# Patient Record
Sex: MALE | Race: WHITE | Employment: OTHER | URBAN - METROPOLITAN AREA
[De-identification: names, ages, dates, MRNs, and addresses within clinical notes are randomized per-mention and may not be internally consistent; named-entity substitution may affect disease eponyms.]

---

## 2019-03-20 ENCOUNTER — APPOINTMENT (OUTPATIENT)
Dept: GENERAL RADIOLOGY | Age: 84
DRG: 291 | End: 2019-03-20
Attending: PHYSICIAN ASSISTANT
Payer: MEDICARE

## 2019-03-20 ENCOUNTER — APPOINTMENT (OUTPATIENT)
Dept: VASCULAR SURGERY | Age: 84
DRG: 291 | End: 2019-03-20
Attending: PHYSICIAN ASSISTANT
Payer: MEDICARE

## 2019-03-20 ENCOUNTER — HOSPITAL ENCOUNTER (INPATIENT)
Age: 84
LOS: 2 days | Discharge: HOME HEALTH | DRG: 291 | End: 2019-03-22
Attending: EMERGENCY MEDICINE | Admitting: INTERNAL MEDICINE
Payer: MEDICARE

## 2019-03-20 DIAGNOSIS — I50.9 CONGESTIVE HEART FAILURE, UNSPECIFIED HF CHRONICITY, UNSPECIFIED HEART FAILURE TYPE (HCC): Primary | ICD-10-CM

## 2019-03-20 PROBLEM — I50.41 ACUTE COMBINED SYSTOLIC AND DIASTOLIC HEART FAILURE (HCC): Status: ACTIVE | Noted: 2017-12-04

## 2019-03-20 PROBLEM — I50.22 CHRONIC SYSTOLIC CONGESTIVE HEART FAILURE (HCC): Status: ACTIVE | Noted: 2018-03-14

## 2019-03-20 PROBLEM — E16.2 HYPOGLYCEMIA: Status: ACTIVE | Noted: 2019-03-20

## 2019-03-20 PROBLEM — D64.9 CHRONIC ANEMIA: Status: ACTIVE | Noted: 2018-05-23

## 2019-03-20 PROBLEM — I48.91 ATRIAL FIBRILLATION (HCC): Status: ACTIVE | Noted: 2017-08-25

## 2019-03-20 LAB
ALBUMIN SERPL-MCNC: 3 G/DL (ref 3.4–5)
ALBUMIN/GLOB SERPL: 0.7 {RATIO} (ref 0.8–1.7)
ALP SERPL-CCNC: 325 U/L (ref 45–117)
ALT SERPL-CCNC: 34 U/L (ref 16–61)
ANION GAP SERPL CALC-SCNC: 3 MMOL/L (ref 3–18)
AST SERPL-CCNC: 49 U/L (ref 15–37)
BASOPHILS # BLD: 0 K/UL (ref 0–0.1)
BASOPHILS NFR BLD: 1 % (ref 0–2)
BILIRUB SERPL-MCNC: 1 MG/DL (ref 0.2–1)
BNP SERPL-MCNC: ABNORMAL PG/ML (ref 0–1800)
BUN SERPL-MCNC: 25 MG/DL (ref 7–18)
BUN/CREAT SERPL: 22 (ref 12–20)
CALCIUM SERPL-MCNC: 8.5 MG/DL (ref 8.5–10.1)
CHLORIDE SERPL-SCNC: 103 MMOL/L (ref 100–108)
CK MB CFR SERPL CALC: 3.5 % (ref 0–4)
CK MB SERPL-MCNC: 2.8 NG/ML (ref 5–25)
CK SERPL-CCNC: 80 U/L (ref 39–308)
CO2 SERPL-SCNC: 35 MMOL/L (ref 21–32)
CREAT SERPL-MCNC: 1.13 MG/DL (ref 0.6–1.3)
DIFFERENTIAL METHOD BLD: ABNORMAL
EOSINOPHIL # BLD: 0 K/UL (ref 0–0.4)
EOSINOPHIL NFR BLD: 0 % (ref 0–5)
ERYTHROCYTE [DISTWIDTH] IN BLOOD BY AUTOMATED COUNT: 16.4 % (ref 11.6–14.5)
GLOBULIN SER CALC-MCNC: 4.2 G/DL (ref 2–4)
GLUCOSE SERPL-MCNC: 73 MG/DL (ref 74–99)
HCT VFR BLD AUTO: 42 % (ref 36–48)
HGB BLD-MCNC: 14.4 G/DL (ref 13–16)
LYMPHOCYTES # BLD: 0.8 K/UL (ref 0.9–3.6)
LYMPHOCYTES NFR BLD: 14 % (ref 21–52)
MCH RBC QN AUTO: 34.2 PG (ref 24–34)
MCHC RBC AUTO-ENTMCNC: 34.3 G/DL (ref 31–37)
MCV RBC AUTO: 99.8 FL (ref 74–97)
MONOCYTES # BLD: 0.5 K/UL (ref 0.05–1.2)
MONOCYTES NFR BLD: 10 % (ref 3–10)
NEUTS SEG # BLD: 4 K/UL (ref 1.8–8)
NEUTS SEG NFR BLD: 75 % (ref 40–73)
PLATELET # BLD AUTO: 132 K/UL (ref 135–420)
PMV BLD AUTO: 12.8 FL (ref 9.2–11.8)
POTASSIUM SERPL-SCNC: 4 MMOL/L (ref 3.5–5.5)
PROT SERPL-MCNC: 7.2 G/DL (ref 6.4–8.2)
RBC # BLD AUTO: 4.21 M/UL (ref 4.7–5.5)
SODIUM SERPL-SCNC: 141 MMOL/L (ref 136–145)
TROPONIN I SERPL-MCNC: 0.09 NG/ML (ref 0–0.04)
WBC # BLD AUTO: 5.3 K/UL (ref 4.6–13.2)

## 2019-03-20 PROCEDURE — 85025 COMPLETE CBC W/AUTO DIFF WBC: CPT

## 2019-03-20 PROCEDURE — 82550 ASSAY OF CK (CPK): CPT

## 2019-03-20 PROCEDURE — 83036 HEMOGLOBIN GLYCOSYLATED A1C: CPT

## 2019-03-20 PROCEDURE — 65270000029 HC RM PRIVATE

## 2019-03-20 PROCEDURE — 80162 ASSAY OF DIGOXIN TOTAL: CPT

## 2019-03-20 PROCEDURE — 80053 COMPREHEN METABOLIC PANEL: CPT

## 2019-03-20 PROCEDURE — 99285 EMERGENCY DEPT VISIT HI MDM: CPT

## 2019-03-20 PROCEDURE — 94761 N-INVAS EAR/PLS OXIMETRY MLT: CPT

## 2019-03-20 PROCEDURE — 71045 X-RAY EXAM CHEST 1 VIEW: CPT

## 2019-03-20 PROCEDURE — 74011250636 HC RX REV CODE- 250/636: Performed by: PHYSICIAN ASSISTANT

## 2019-03-20 PROCEDURE — 93005 ELECTROCARDIOGRAM TRACING: CPT

## 2019-03-20 PROCEDURE — 96374 THER/PROPH/DIAG INJ IV PUSH: CPT

## 2019-03-20 PROCEDURE — 93971 EXTREMITY STUDY: CPT

## 2019-03-20 PROCEDURE — 83880 ASSAY OF NATRIURETIC PEPTIDE: CPT

## 2019-03-20 RX ORDER — CHOLECALCIFEROL (VITAMIN D3) 125 MCG
2000 CAPSULE ORAL DAILY
COMMUNITY

## 2019-03-20 RX ORDER — MAGNESIUM SULFATE 100 %
4 CRYSTALS MISCELLANEOUS AS NEEDED
Status: DISCONTINUED | OUTPATIENT
Start: 2019-03-20 | End: 2019-03-22 | Stop reason: HOSPADM

## 2019-03-20 RX ORDER — TORSEMIDE 10 MG/1
10 TABLET ORAL DAILY
COMMUNITY

## 2019-03-20 RX ORDER — DEXTROSE 50 % IN WATER (D50W) INTRAVENOUS SYRINGE
25-50 AS NEEDED
Status: DISCONTINUED | OUTPATIENT
Start: 2019-03-20 | End: 2019-03-22 | Stop reason: HOSPADM

## 2019-03-20 RX ORDER — FUROSEMIDE 10 MG/ML
40 INJECTION INTRAMUSCULAR; INTRAVENOUS EVERY 12 HOURS
Status: DISCONTINUED | OUTPATIENT
Start: 2019-03-20 | End: 2019-03-22 | Stop reason: HOSPADM

## 2019-03-20 RX ORDER — METOPROLOL SUCCINATE 100 MG/1
100 TABLET, EXTENDED RELEASE ORAL DAILY
COMMUNITY

## 2019-03-20 RX ORDER — SIMVASTATIN 20 MG/1
20 TABLET, FILM COATED ORAL
COMMUNITY

## 2019-03-20 RX ORDER — GUAIFENESIN 100 MG/5ML
81 LIQUID (ML) ORAL DAILY
COMMUNITY

## 2019-03-20 RX ORDER — INSULIN LISPRO 100 [IU]/ML
INJECTION, SOLUTION INTRAVENOUS; SUBCUTANEOUS
Status: DISCONTINUED | OUTPATIENT
Start: 2019-03-21 | End: 2019-03-22 | Stop reason: HOSPADM

## 2019-03-20 RX ORDER — SODIUM FLUORIDE 5 MG/G
PASTE, DENTIFRICE DENTAL
COMMUNITY
End: 2019-03-26 | Stop reason: CLARIF

## 2019-03-20 RX ORDER — ACETAMINOPHEN 325 MG/1
650 TABLET ORAL
Status: DISCONTINUED | OUTPATIENT
Start: 2019-03-20 | End: 2019-03-22 | Stop reason: HOSPADM

## 2019-03-20 RX ORDER — FUROSEMIDE 10 MG/ML
40 INJECTION INTRAMUSCULAR; INTRAVENOUS
Status: COMPLETED | OUTPATIENT
Start: 2019-03-20 | End: 2019-03-20

## 2019-03-20 RX ORDER — DIGOXIN 125 MCG
0.12 TABLET ORAL DAILY
COMMUNITY

## 2019-03-20 RX ADMIN — FUROSEMIDE 40 MG: 10 INJECTION, SOLUTION INTRAMUSCULAR; INTRAVENOUS at 22:48

## 2019-03-20 NOTE — ED PROVIDER NOTES
EMERGENCY DEPARTMENT HISTORY AND PHYSICAL EXAM 
 
Date: 3/20/2019 Patient Name: Alejandra Manzo History of Presenting Illness Chief Complaint Patient presents with  Leg Injury History Provided By: Patient Alejandra Manzo is a 80 y.o. male with PMHX of congestive heart failure and hypertension who presents to the emergency department C/O left lower extremity swelling associated sxs include left lower extremity wounds with clear oozing. Since and his wife report a history of chronic leg edema on daily fluid pill reports 1 month ago started with increased swelling to left lower extremity developing 2 small ulcerated type wounds to the lateral aspect the left lower leg with some clear oozing drainage. Patient was seen at a local urgent care prescribed topical ointment with minimal relief. His wife said they were seen in PCPs office next couple of days following that was prescribed oral antibiotic Bactrim patient took 3 doses of developed a rash of the antibiotic was discontinued. Patient and his wife reports attempts at elevating the leg (however on further evaluation patient not elevating the leg to high enough level to help with edema) with no relief of the swelling. Pt denies chest pain shortness of breath leg pain purulent drainage injury to lower extremity, and any other sxs or complaints. PCP: Trevor Lemon MD 
 
 
 
Past History Past Medical History: 
Past Medical History:  
Diagnosis Date  CHF (congestive heart failure) ([facility] [address]  Hypertension Past Surgical History: 
History reviewed. No pertinent surgical history. Family History: 
History reviewed. No pertinent family history. Social History: 
Social History Tobacco Use  Smoking status: Never Smoker  Smokeless tobacco: Never Used Substance Use Topics  Alcohol use: Yes Frequency: Never Comment: socially  Drug use: Never Allergies: 
No Known Allergies Review of Systems Review of Systems Constitutional: Negative for fever. Respiratory: Negative for shortness of breath. Cardiovascular: Positive for leg swelling. Negative for chest pain. Skin: Positive for wound. All other systems reviewed and are negative. Physical Exam  
 
Vitals:  
 03/20/19 1832 03/20/19 2210 03/20/19 2213 BP: 107/80 117/83 117/83 Pulse: 89 94 94 Resp: 16 14 14 Temp: 98.1 °F (36.7 °C)  97.6 °F (36.4 °C) SpO2: 100% 97% 98% Weight: 60.8 kg (134 lb) Height: 5' 9\" (1.753 m) Physical Exam  
Constitutional: He is oriented to person, place, and time. He appears well-developed and well-nourished. No distress. HENT:  
Head: Normocephalic and atraumatic. Eyes: Pupils are equal, round, and reactive to light. Conjunctivae and EOM are normal.  
Neck: Normal range of motion. Neck supple. Cardiovascular: Normal rate and regular rhythm. Pulmonary/Chest: Effort normal and breath sounds normal.  
Musculoskeletal: Normal range of motion. He exhibits edema. He exhibits no tenderness. Legs: LLE: nonpitting edema with 2 small open blister-like lesions to lateral aspect each with small halo of erythema, +thin clear/serous drainage from wound noted; NO streaking or ttp; FROM, NVI Neurological: He is alert and oriented to person, place, and time. Skin: Skin is warm and dry. Psychiatric: He has a normal mood and affect. His behavior is normal.  
Nursing note and vitals reviewed. Diagnostic Study Results Labs - Recent Results (from the past 12 hour(s)) CBC WITH AUTOMATED DIFF Collection Time: 03/20/19  7:55 PM  
Result Value Ref Range WBC 5.3 4.6 - 13.2 K/uL  
 RBC 4.21 (L) 4.70 - 5.50 M/uL  
 HGB 14.4 13.0 - 16.0 g/dL HCT 42.0 36.0 - 48.0 % MCV 99.8 (H) 74.0 - 97.0 FL  
 MCH 34.2 (H) 24.0 - 34.0 PG  
 MCHC 34.3 31.0 - 37.0 g/dL  
 RDW 16.4 (H) 11.6 - 14.5 % PLATELET 746 (L) 656 - 420 K/uL  MPV 12.8 (H) 9.2 - 11.8 FL  
 NEUTROPHILS 75 (H) 40 - 73 % LYMPHOCYTES 14 (L) 21 - 52 % MONOCYTES 10 3 - 10 % EOSINOPHILS 0 0 - 5 % BASOPHILS 1 0 - 2 %  
 ABS. NEUTROPHILS 4.0 1.8 - 8.0 K/UL  
 ABS. LYMPHOCYTES 0.8 (L) 0.9 - 3.6 K/UL  
 ABS. MONOCYTES 0.5 0.05 - 1.2 K/UL  
 ABS. EOSINOPHILS 0.0 0.0 - 0.4 K/UL  
 ABS. BASOPHILS 0.0 0.0 - 0.1 K/UL  
 DF AUTOMATED METABOLIC PANEL, COMPREHENSIVE Collection Time: 03/20/19  7:55 PM  
Result Value Ref Range Sodium 141 136 - 145 mmol/L Potassium 4.0 3.5 - 5.5 mmol/L Chloride 103 100 - 108 mmol/L  
 CO2 35 (H) 21 - 32 mmol/L Anion gap 3 3.0 - 18 mmol/L Glucose 73 (L) 74 - 99 mg/dL BUN 25 (H) 7.0 - 18 MG/DL Creatinine 1.13 0.6 - 1.3 MG/DL  
 BUN/Creatinine ratio 22 (H) 12 - 20 GFR est AA >60 >60 ml/min/1.73m2 GFR est non-AA >60 >60 ml/min/1.73m2 Calcium 8.5 8.5 - 10.1 MG/DL Bilirubin, total 1.0 0.2 - 1.0 MG/DL  
 ALT (SGPT) 34 16 - 61 U/L  
 AST (SGOT) 49 (H) 15 - 37 U/L Alk. phosphatase 325 (H) 45 - 117 U/L Protein, total 7.2 6.4 - 8.2 g/dL Albumin 3.0 (L) 3.4 - 5.0 g/dL Globulin 4.2 (H) 2.0 - 4.0 g/dL A-G Ratio 0.7 (L) 0.8 - 1.7 CARDIAC PANEL,(CK, CKMB & TROPONIN) Collection Time: 03/20/19  7:55 PM  
Result Value Ref Range CK 80 39 - 308 U/L  
 CK - MB 2.8 <3.6 ng/ml CK-MB Index 3.5 0.0 - 4.0 % Troponin-I, QT 0.09 (H) 0.0 - 0.045 NG/ML  
NT-PRO BNP Collection Time: 03/20/19  8:30 PM  
Result Value Ref Range NT pro-BNP 17,709 (H) 0 - 1,800 PG/ML  
EKG, 12 LEAD, INITIAL Collection Time: 03/20/19  9:50 PM  
Result Value Ref Range Ventricular Rate 102 BPM  
 Atrial Rate 122 BPM  
 P-R Interval 200 ms QRS Duration 182 ms Q-T Interval 420 ms QTC Calculation (Bezet) 547 ms Calculated R Axis -25 degrees Calculated T Axis 160 degrees Diagnosis Sinus tachycardia with occasional premature ventricular complexes Left bundle branch block Abnormal ECG No previous ECGs available Radiologic Studies -  
XR CHEST PORT Final Result IMPRESSION: Small effusions bilaterally with right lung base atelectasis and  
mild pulmonary edema CT Results  (Last 48 hours) None CXR Results  (Last 48 hours) 03/20/19 2005  XR CHEST PORT Final result Impression:  IMPRESSION: Small effusions bilaterally with right lung base atelectasis and  
mild pulmonary edema Narrative:  EXAM:  AP Portable Chest X-ray 1 view INDICATION: History of CHF, evaluate for CHF  
   
COMPARISON: None  
   
_______________ FINDINGS: There are sternotomy wires from prior thoracotomy. Heart size is  
enlarged and mediastinal contours are within normal limits for portable  
radiograph. There are increased interstitial markings suggesting mild pulmonary  
edema. There is an elevated left hemidiaphragm. There is mild blunting of both  
costophrenic angle suggesting small effusions. There is right lung base  
atelectasis. No acute osseous findings. ________________ VASCULAR STUDY: 
Right Lower Venous No evidence of deep vein thrombosis in the right lower extremity. The common femoral vein(s) were imaged in the transverse and longitudinal planes. The vessels showed normal color filling and compressibility. Doppler interrogation of the veins showed phasic and spontaneous flow. Pulsatile flow pattern noted in the right common femoral vein. Left Lower Venous No evidence of deep vein thrombosis in the left lower extremity. The common femoral, saphenous femoral junction, profunda femoral, proximal femoral, mid femoral, distal femoral, popliteal, posterior tibial and peroneal vein(s) were imaged in the transverse and longitudinal planes. The vessels showed normal color filling and compressibility. Doppler interrogation of the veins showed phasic and spontaneous flow. Pulsatile flow pattern noted in the left lower extremity throughout. Medications given in the ED- Medications  
furosemide (LASIX) injection 40 mg (has no administration in time range) Medical Decision Making I am the first provider for this patient. I reviewed the vital signs, available nursing notes, past medical history, past surgical history, family history and social history. Vital Signs-Reviewed the patient's vital signs. Pulse Oximetry Analysis - 100% on RA Records Reviewed: Nursing Notes Procedures: 
Procedures ED Course:  
7:16 PM  
Initial assessment performed. The patients presenting problems have been discussed, and they are in agreement with the care plan formulated and outlined with them. I have encouraged them to ask questions as they arise throughout their visit. FACE-TO-FACE PROGRESS NOTE: 
10:22 PM  
She was interviewed bedside with the PA. Patient has bilateral lower extremity edema with weeping from his left lower extremity. There is some erythema extending proximally from his left lateral wound going medially into his thigh which is warm. Has no chest pain or shortness of breath. His O2 saturation on room air was 97 nonlabored. It is likely that his lower extremity edema secondary to heart failure. Lab analysis noted to BNP elevation of 17,000. Chest x-ray shows bilateral effusions with pulmonary vascular congestion. Last echocardiogram was 2017 with an EF of 30%. We will seek admission speak with the hospitalist for diuresis and further evaluation. I have personally seen and examined the patient, reviewed the ZOILA's note and agree with findings and plan. Dictated by Dr Jasbir Johnson, ED Attending Diagnosis and Disposition Critical Care Time: 0 Core Measures: 
For Hospitalized Patients: 
 
1. Hospitalization Decision Time: The decision to hospitalize the patient was made by SPENCER Viera  at 10:34 PM  on 3/20/2019 2.  Aspirin: Aspirin was not given because the patient did not present with a stroke at the time of their Emergency Department evaluation 11:04 PM  Patient is being admitted to the hospital by Dr Maria Guadalupe Funk. The results of their tests and reasons for their admission have been discussed with them and/or available family. They convey agreement and understanding for the need to be admitted and for their admission diagnosis. CONDITIONS ON ADMISSION: 
Sepsis is not present at the time of admission. Deep Vein Thrombosis is not present at the time of admission. Thrombosis is not present at the time of admission. Pneumonia is not present at the time of admission. MRSA is not present at the time of admission. Wound infection is not present at the time of admission. Pressure Ulcer is present at the time of admission. CLINICAL IMPRESSION: 
 
1. Congestive heart failure, unspecified HF chronicity, unspecified heart failure type ([address] Please note that this dictation was completed with [employer], the computer voice recognition software. Quite often unanticipated grammatical, syntax, homophones, and other interpretive errors are inadvertently transcribed by the computer software. Please disregard these errors. Please excuse any errors that have escaped final proofreading.

## 2019-03-20 NOTE — ED TRIAGE NOTES
Patient ambulate to ED with left lower leg drainage since this afternoon, patient denies injury, reports increase in swelling over last month, a/ox3, wound dressing, clean/dry/intact in triage

## 2019-03-21 ENCOUNTER — APPOINTMENT (OUTPATIENT)
Dept: NON INVASIVE DIAGNOSTICS | Age: 84
DRG: 291 | End: 2019-03-21
Attending: INTERNAL MEDICINE
Payer: MEDICARE

## 2019-03-21 PROBLEM — I87.2 VENOUS INSUFFICIENCY OF BOTH LOWER EXTREMITIES: Status: ACTIVE | Noted: 2019-03-21

## 2019-03-21 LAB
ALBUMIN SERPL-MCNC: 2.7 G/DL (ref 3.4–5)
ALBUMIN/GLOB SERPL: 0.7 {RATIO} (ref 0.8–1.7)
ALP SERPL-CCNC: 303 U/L (ref 45–117)
ALT SERPL-CCNC: 30 U/L (ref 16–61)
ANION GAP SERPL CALC-SCNC: 5 MMOL/L (ref 3–18)
AST SERPL-CCNC: 39 U/L (ref 15–37)
BASOPHILS # BLD: 0 K/UL (ref 0–0.1)
BASOPHILS NFR BLD: 1 % (ref 0–2)
BILIRUB SERPL-MCNC: 1.1 MG/DL (ref 0.2–1)
BUN SERPL-MCNC: 22 MG/DL (ref 7–18)
BUN/CREAT SERPL: 21 (ref 12–20)
CALCIUM SERPL-MCNC: 8.4 MG/DL (ref 8.5–10.1)
CHLORIDE SERPL-SCNC: 102 MMOL/L (ref 100–108)
CK MB CFR SERPL CALC: 3.7 % (ref 0–4)
CK MB CFR SERPL CALC: 4 % (ref 0–4)
CK MB SERPL-MCNC: 2.4 NG/ML (ref 5–25)
CK MB SERPL-MCNC: 3.2 NG/ML (ref 5–25)
CK SERPL-CCNC: 65 U/L (ref 39–308)
CK SERPL-CCNC: 80 U/L (ref 39–308)
CO2 SERPL-SCNC: 34 MMOL/L (ref 21–32)
CREAT SERPL-MCNC: 1.04 MG/DL (ref 0.6–1.3)
DIFFERENTIAL METHOD BLD: ABNORMAL
DIGOXIN SERPL-MCNC: 1.2 NG/ML (ref 0.9–2)
ECHO AO ROOT DIAM: 3.24 CM
ECHO AV AREA PEAK VELOCITY: 0.9 CM2
ECHO AV AREA VTI: 0.8 CM2
ECHO AV AREA/BSA PEAK VELOCITY: 0.5 CM2/M2
ECHO AV AREA/BSA VTI: 0.5 CM2/M2
ECHO AV MEAN GRADIENT: 9.2 MMHG
ECHO AV PEAK GRADIENT: 15.9 MMHG
ECHO AV PEAK VELOCITY: 199.29 CM/S
ECHO AV VTI: 36.48 CM
ECHO LA MAJOR AXIS: 5.6 CM
ECHO LA VOL 2C: 123.12 ML (ref 18–58)
ECHO LA VOL 4C: 111.91 ML (ref 18–58)
ECHO LA VOL BP: 132 ML (ref 18–58)
ECHO LA VOL/BSA BIPLANE: 75.76 ML/M2 (ref 16–28)
ECHO LA VOLUME INDEX A2C: 70.66 ML/M2 (ref 16–28)
ECHO LA VOLUME INDEX A4C: 64.23 ML/M2 (ref 16–28)
ECHO LV E' LATERAL VELOCITY: 8 CM/S
ECHO LV E' SEPTAL VELOCITY: 2 CM/S
ECHO LV EDV A2C: 124.7 ML
ECHO LV EDV A4C: 71.7 ML
ECHO LV EDV BP: 96.1 ML (ref 67–155)
ECHO LV EDV INDEX A4C: 41.2 ML/M2
ECHO LV EDV INDEX BP: 55.2 ML/M2
ECHO LV EDV NDEX A2C: 71.6 ML/M2
ECHO LV EJECTION FRACTION A2C: 0 %
ECHO LV EJECTION FRACTION A4C: 18 %
ECHO LV EJECTION FRACTION BIPLANE: 2.5 % (ref 55–100)
ECHO LV ESV A2C: 124.7 ML
ECHO LV ESV A4C: 58.7 ML
ECHO LV ESV BP: 93.7 ML (ref 22–58)
ECHO LV ESV INDEX A2C: 71.6 ML/M2
ECHO LV ESV INDEX A4C: 33.7 ML/M2
ECHO LV ESV INDEX BP: 53.8 ML/M2
ECHO LV INTERNAL DIMENSION DIASTOLIC: 5.18 CM (ref 4.2–5.9)
ECHO LV INTERNAL DIMENSION SYSTOLIC: 4.45 CM
ECHO LV IVSD: 0.97 CM (ref 0.6–1)
ECHO LV MASS 2D: 226.7 G (ref 88–224)
ECHO LV MASS INDEX 2D: 130.1 G/M2 (ref 49–115)
ECHO LV POSTERIOR WALL DIASTOLIC: 1.03 CM (ref 0.6–1)
ECHO LVOT DIAM: 1.94 CM
ECHO LVOT PEAK GRADIENT: 1.4 MMHG
ECHO LVOT PEAK VELOCITY: 59.19 CM/S
ECHO LVOT VTI: 10.15 CM
ECHO MV A VELOCITY: 41.62 CM/S
ECHO MV AREA PHT: 5.3 CM2
ECHO MV AREA PISA: 0.2 CM2
ECHO MV AREA VTI: 1.1 CM2
ECHO MV E DECELERATION TIME (DT): 143.4 MS
ECHO MV E VELOCITY: 110.64 CM/S
ECHO MV E/A RATIO: 2.66
ECHO MV E/E' LATERAL: 13.83
ECHO MV E/E' RATIO (AVERAGED): 34.58
ECHO MV E/E' SEPTAL: 55.32
ECHO MV EROA PISA: 0.2 CM2
ECHO MV MAX VELOCITY: 135.62 CM/S
ECHO MV MEAN GRADIENT: 2.4 MMHG
ECHO MV PEAK GRADIENT: 7.4 MMHG
ECHO MV PRESSURE HALF TIME (PHT): 41.6 MS
ECHO MV REGURGITANT PEAK GRADIENT: 88.9 MMHG
ECHO MV REGURGITANT PEAK VELOCITY: 471.3 CM/S
ECHO MV REGURGITANT RADIUS PISA: 0.6 CM
ECHO MV REGURGITANT VOLUME: 27.94 CC
ECHO MV REGURGITANT VTIA: 183.73 CM
ECHO MV VTI: 27.14 CM
ECHO PULMONARY ARTERY SYSTOLIC PRESSURE (PASP): 35 MMHG
ECHO RA AREA 4C: 31.67 CM2
ECHO TV REGURGITANT MAX VELOCITY: 274.62 CM/S
ECHO TV REGURGITANT PEAK GRADIENT: 30.2 MMHG
EOSINOPHIL # BLD: 0 K/UL (ref 0–0.4)
EOSINOPHIL NFR BLD: 0 % (ref 0–5)
ERYTHROCYTE [DISTWIDTH] IN BLOOD BY AUTOMATED COUNT: 16.4 % (ref 11.6–14.5)
EST. AVERAGE GLUCOSE BLD GHB EST-MCNC: 120 MG/DL
GLOBULIN SER CALC-MCNC: 3.8 G/DL (ref 2–4)
GLUCOSE BLD STRIP.AUTO-MCNC: 123 MG/DL (ref 70–110)
GLUCOSE BLD STRIP.AUTO-MCNC: 140 MG/DL (ref 70–110)
GLUCOSE BLD STRIP.AUTO-MCNC: 85 MG/DL (ref 70–110)
GLUCOSE BLD STRIP.AUTO-MCNC: 86 MG/DL (ref 70–110)
GLUCOSE SERPL-MCNC: 78 MG/DL (ref 74–99)
HBA1C MFR BLD: 5.8 % (ref 4.2–5.6)
HCT VFR BLD AUTO: 41.7 % (ref 36–48)
HGB BLD-MCNC: 14.2 G/DL (ref 13–16)
LYMPHOCYTES # BLD: 0.9 K/UL (ref 0.9–3.6)
LYMPHOCYTES NFR BLD: 20 % (ref 21–52)
MAGNESIUM SERPL-MCNC: 2.5 MG/DL (ref 1.6–2.6)
MCH RBC QN AUTO: 33.7 PG (ref 24–34)
MCHC RBC AUTO-ENTMCNC: 34.1 G/DL (ref 31–37)
MCV RBC AUTO: 99 FL (ref 74–97)
MONOCYTES # BLD: 0.5 K/UL (ref 0.05–1.2)
MONOCYTES NFR BLD: 11 % (ref 3–10)
NEUTS SEG # BLD: 3.1 K/UL (ref 1.8–8)
NEUTS SEG NFR BLD: 68 % (ref 40–73)
PLATELET # BLD AUTO: 134 K/UL (ref 135–420)
PMV BLD AUTO: 12.8 FL (ref 9.2–11.8)
POTASSIUM SERPL-SCNC: 3.3 MMOL/L (ref 3.5–5.5)
PROT SERPL-MCNC: 6.5 G/DL (ref 6.4–8.2)
RBC # BLD AUTO: 4.21 M/UL (ref 4.7–5.5)
SODIUM SERPL-SCNC: 141 MMOL/L (ref 136–145)
TROPONIN I SERPL-MCNC: 0.07 NG/ML (ref 0–0.04)
TROPONIN I SERPL-MCNC: 0.08 NG/ML (ref 0–0.04)
WBC # BLD AUTO: 4.6 K/UL (ref 4.6–13.2)

## 2019-03-21 PROCEDURE — 74011250637 HC RX REV CODE- 250/637: Performed by: INTERNAL MEDICINE

## 2019-03-21 PROCEDURE — 65660000000 HC RM CCU STEPDOWN

## 2019-03-21 PROCEDURE — 74011250636 HC RX REV CODE- 250/636: Performed by: HOSPITALIST

## 2019-03-21 PROCEDURE — 83735 ASSAY OF MAGNESIUM: CPT

## 2019-03-21 PROCEDURE — 97161 PT EVAL LOW COMPLEX 20 MIN: CPT

## 2019-03-21 PROCEDURE — 85025 COMPLETE CBC W/AUTO DIFF WBC: CPT

## 2019-03-21 PROCEDURE — 74011000250 HC RX REV CODE- 250: Performed by: HOSPITALIST

## 2019-03-21 PROCEDURE — 80053 COMPREHEN METABOLIC PANEL: CPT

## 2019-03-21 PROCEDURE — 82962 GLUCOSE BLOOD TEST: CPT

## 2019-03-21 PROCEDURE — 36415 COLL VENOUS BLD VENIPUNCTURE: CPT

## 2019-03-21 PROCEDURE — 74011250636 HC RX REV CODE- 250/636: Performed by: INTERNAL MEDICINE

## 2019-03-21 PROCEDURE — 93306 TTE W/DOPPLER COMPLETE: CPT

## 2019-03-21 PROCEDURE — 74011250637 HC RX REV CODE- 250/637: Performed by: HOSPITALIST

## 2019-03-21 PROCEDURE — 82550 ASSAY OF CK (CPK): CPT

## 2019-03-21 RX ORDER — POTASSIUM CHLORIDE 20 MEQ/1
20 TABLET, EXTENDED RELEASE ORAL 2 TIMES DAILY
Status: DISCONTINUED | OUTPATIENT
Start: 2019-03-21 | End: 2019-03-21

## 2019-03-21 RX ORDER — DIGOXIN 125 MCG
0.12 TABLET ORAL DAILY
Status: DISCONTINUED | OUTPATIENT
Start: 2019-03-21 | End: 2019-03-22 | Stop reason: HOSPADM

## 2019-03-21 RX ORDER — POTASSIUM CHLORIDE 20 MEQ/1
40 TABLET, EXTENDED RELEASE ORAL DAILY
Status: DISCONTINUED | OUTPATIENT
Start: 2019-03-21 | End: 2019-03-22 | Stop reason: HOSPADM

## 2019-03-21 RX ORDER — METOPROLOL SUCCINATE 100 MG/1
100 TABLET, EXTENDED RELEASE ORAL DAILY
Status: DISCONTINUED | OUTPATIENT
Start: 2019-03-21 | End: 2019-03-22 | Stop reason: HOSPADM

## 2019-03-21 RX ORDER — SIMVASTATIN 20 MG/1
20 TABLET, FILM COATED ORAL
Status: DISCONTINUED | OUTPATIENT
Start: 2019-03-21 | End: 2019-03-22 | Stop reason: HOSPADM

## 2019-03-21 RX ORDER — GUAIFENESIN 100 MG/5ML
81 LIQUID (ML) ORAL DAILY
Status: DISCONTINUED | OUTPATIENT
Start: 2019-03-21 | End: 2019-03-22 | Stop reason: HOSPADM

## 2019-03-21 RX ADMIN — ASPIRIN 81 MG 81 MG: 81 TABLET ORAL at 09:13

## 2019-03-21 RX ADMIN — POTASSIUM CHLORIDE 40 MEQ: 20 TABLET, EXTENDED RELEASE ORAL at 15:09

## 2019-03-21 RX ADMIN — METOPROLOL SUCCINATE 100 MG: 100 TABLET, EXTENDED RELEASE ORAL at 09:13

## 2019-03-21 RX ADMIN — CEFTRIAXONE 1 G: 1 INJECTION, POWDER, FOR SOLUTION INTRAMUSCULAR; INTRAVENOUS at 11:56

## 2019-03-21 RX ADMIN — APIXABAN 2.5 MG: 2.5 TABLET, FILM COATED ORAL at 21:14

## 2019-03-21 RX ADMIN — FUROSEMIDE 40 MG: 10 INJECTION, SOLUTION INTRAMUSCULAR; INTRAVENOUS at 09:12

## 2019-03-21 RX ADMIN — MULTIPLE VITAMINS W/ MINERALS TAB 1 TABLET: TAB at 09:13

## 2019-03-21 RX ADMIN — POTASSIUM CHLORIDE 20 MEQ: 20 TABLET, EXTENDED RELEASE ORAL at 11:57

## 2019-03-21 RX ADMIN — FUROSEMIDE 40 MG: 10 INJECTION, SOLUTION INTRAMUSCULAR; INTRAVENOUS at 21:14

## 2019-03-21 RX ADMIN — DIGOXIN 0.12 MG: 125 TABLET ORAL at 09:13

## 2019-03-21 RX ADMIN — SIMVASTATIN 20 MG: 20 TABLET, FILM COATED ORAL at 21:14

## 2019-03-21 RX ADMIN — APIXABAN 2.5 MG: 2.5 TABLET, FILM COATED ORAL at 09:13

## 2019-03-21 NOTE — ROUTINE PROCESS
TRANSFER - OUT REPORT: 
 
Verbal report given to DANNY Cates RN(name) on Kriss August  being transferred to Tele(unit) for routine progression of care Report consisted of patients Situation, Background, Assessment and  
Recommendations(SBAR). Information from the following report(s) SBAR was reviewed with the receiving nurse. Lines:  
Peripheral IV 03/20/19 Left Antecubital (Active) Site Assessment Clean, dry, & intact 3/20/2019  7:55 PM  
Phlebitis Assessment 0 3/20/2019  7:55 PM  
Infiltration Assessment 0 3/20/2019  7:55 PM  
Dressing Status Clean, dry, & intact 3/20/2019  7:55 PM  
Dressing Type Tape;Transparent 3/20/2019  7:55 PM  
Hub Color/Line Status Pink;Capped;Flushed 3/20/2019  7:55 PM  
Action Taken Blood drawn 3/20/2019  7:55 PM  
Alcohol Cap Used Yes 3/20/2019  7:55 PM  
  
 
Opportunity for questions and clarification was provided. Patient transported with: 
 Monitor RRT Tech

## 2019-03-21 NOTE — ROUTINE PROCESS
TRANSFER - IN REPORT: 
 
Verbal report received from Brenna RN(name) on Julito Ralph  being received from ED(unit) for routine progression of care Report consisted of patients Situation, Background, Assessment and  
Recommendations(SBAR). Information from the following report(s) SBAR, Kardex and MAR was reviewed with the receiving nurse. Opportunity for questions and clarification was provided. Assessment completed upon patients arrival to unit and care assumed.

## 2019-03-21 NOTE — CDMP QUERY
After further study, do you concur with the findings of Severe Protein Calorie Malnutrition noted in the Dietitian  Consultation note? ? Severe Protein Calorie Malnutrition ? Moderate/Mild Protein Calorie Malnutrition ? Other Explanation of the clinical findings ? Clinically Undetermined (no explanation for clinical findings) The medical record reflects the following clinical findings, risk factors and treatment:  
 
Risk Factors: elderly, stasis edema with ulceration Clinical Indicators: RD note-- \"SEVERE PROTEIN CALORIE MALNUTRITION IN THE CONTEXT OF CHRONIC ILLNESS Body Fat:  Severe Depletion Muscle Mass: Severe Depletion Malnutrition related to inadequate oral intake as evidence by NFPE 
 
 Diet History: pt reports appetite to always be good; NFPE shows chest bones showing, tricep wasting, temporal wasting BMI=19.8 Treatment:Ensure Enlive TID Please clarify and document your clinical opinion in the progress notes and discharge summary including the definitive and/or presumptive diagnosis, (suspected or probable), related to the above clinical findings. Please include clinical findings supporting your diagnosis. Thank you Carey Jones RN [place] [phone]

## 2019-03-21 NOTE — PROGRESS NOTES
INITIAL NUTRITION ASSESSMENT  
RECOMMENDATIONS/PLAN:  
Supplement: Ensure Enlive TID Diet: Liberalize diet to Regular w/ Salt Restriction Monitor labs/lytes, PO intakes, skin integrity, wt, fluid status, BM Adult Malnutrition Criteria:  
 
Nutrition assessment was completed by RDN and the patient was found to meet the following malnutrition criteria established by ASPEN/AND: 
 
Adult Malnutrition Guidelines: SEVERE PROTEIN CALORIE MALNUTRITION IN THE CONTEXT OF CHRONIC ILLNESS Body Fat:  Severe Depletion Muscle Mass: Severe Depletion Juliabritt Elton 
03/21/19 REASON FOR ASSESSMENT:  
 
[]  RN Referral:  
 [x] MST score >/=2 Malnutrition Screening Tool (MST): 
Recently Lost Weight Without Trying: Unsure If Yes, How Much Weight Loss: Unsure Eating Poorly Due to Decreased Appetite: No 
MST Score: 4 NUTRITION ASSESSMENT:  
Client History: 80 yrs old Male admitted with leg edema and weakness. PMHx: CHF, HTN  
Cultural/Taoism Food Preferences: None Identified FOOD/NUTRITION HISTORY Diet History: pt reports appetite to always be good; NFPE shows chest bones showing, tricep wasting, temporal wasting Food Allergies:  [x] NKFA Pertinent PTA Medications: MVI, vit d3 NUTRITION INTAKE Diet Order:  Clarisa Kappa Average PO Intake:      
No data found. Pertinent Medications:  [x] Reviewed; lasix, MVI Electrolyte Replacement Protocol: []K  []Mg  []PO4 Insulin:  [] SSI  [x] Pre-meal   []  Basal   [] Drip  [] None Pt expected to meet estimated nutrient needs through next review:          [x]  Yes     [] No;  ANTHROPOMETRICS Height: 5' 9\" (175.3 cm)       Weight: 60.8 kg (134 lb) BMI: 19.8 kg/m^2  -  normal weight (18.5%-24.9% BMI) Weight change: pt has noted leg edema wt loss related to fluid changes; UBW per pt report is 135# Comparison to Reference Standards: IBW: 160 lbs      %IBW: 84%      AdjBW:n/a 
 
 NUTRITION-FOCUSED PHYSICAL ASSESSMENT Skin: No PU. GI: +BM 3/21/19 BIOCHEMICAL DATA & MEDICAL TESTS Pertinent Labs:  [x] Reviewed; K-3.3 NUTRITION PRESCRIPTION Calories: 1821 kcal/day based on [place] x 1.4 Protein: 49-61 g/day based on 0.8-1.0 g/kg Fluid: 1821 ml/day based on 1 kcal/ml NUTRITION DIAGNOSES:  
1. Malnutrition related to inadequate oral intake as evidence by NFPE NUTRITION INTERVENTIONS:  
INTERVENTIONS:        GOALS: 
1. Supplement: Ensure Enlive TID Diet: Liberalize diet to Regular w/ Salt Restriction 1. Encourage PO intake >50% at most meals by next review 4 days LEARNING NEEDS (Diet, Supplementation, Food/Nutrient-Drug Interaction):  
[x] None Identified 
[] Inpatient education provided/documented   
[] Identified and patient:  [] Declined     [] Was not appropriate/indicated NUTRITION MONITORING /EVALUATION:  
Follow PO intake Monitor wt Monitor renal labs, electrolytes, fluid status Monitor for additional supplement needs 
 
[] Participated in Interdisciplinary Rounds 
[x] [facility] Reviewed/Documented DISCHARGE NUTRITION RECOMMENDATIONS ADDRESSED:  
  
  [x] To be determined closer to discharge NUTRITION RISK:     [x]  At risk                     []  Not currently at risk Will follow-up per policy. Chaim Gar 9

## 2019-03-21 NOTE — PROGRESS NOTES
0730 Assumed responsibility for patient from Servando Herron RN Bedside shift change report given to Servando Herron RN (oncoming nurse) by Ernestina Dandy, RN (offgoing nurse). Report included the following information SBAR, Kardex and MAR.

## 2019-03-21 NOTE — PROGRESS NOTES
Hospitalist Progress Note Patient: Alejandra Manzo MRN: [id number]  CSN: [id number] YOB: 1929  Age: 80 y.o. Sex: male DOA: 3/20/2019 LOS:  LOS: 1 day Chief Complaint: 
 
Legs swollen Assessment/Plan 81 yo WM with c/o leg weeping. Acute CHF exacerbation-type not known, but suspect systolic and diastolic with aortic stenosis contributory Elevated troponins withour chest pain Hypokalemia-K repeltion Valvular heart disease/ AS Venous insufficiency with weeping edema of LE Chronic persistent afib on eliquis AV replacement LBBB Severe protein thang malnutrition Echo today He sees Dr Glenn Edwards and Demario Mason NP and would prefer to see them for follow up 
IV lasix and K repletion Continue monitoring on telemetry Rocephin for early cellulitis left leg, he recently had a reaction to bactrim that was prescribed for same issue/wound care has wrapped legs and f/u set up for this Tuesday in clinic where he can be fitted for tamar hose PT consult BMP and CBC in am 
 
Family anxious to get  home for bday celebration this weekend, I think he can d/c tomorrow if labs ok and he feels stable Disposition :  
Patient Active Problem List  
Diagnosis Code  CHF (congestive heart failure) (HCC) I50.9  Acute combined systolic and diastolic heart failure (HCC) I50.41  
 Aortic valve stenosis I35.0  Atrial fibrillation (HCC) I48.91  
 Chronic anemia D64.9  Chronic systolic congestive heart failure (HCC) I50.22  
 Essential hypertension I10  
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 
 
Denies CP, SOB Leg swelling appears better to him Denies nausea Wants to get home ASAP as family coming in from all over the country this weekend for his 89th bday party Review of systems: 
 
Constitutional: denies fevers, chills Respiratory: denies SOB Cardiovascular: denies chest pain Gastrointestinal: denies nausea, vomiting Vital signs/Intake and Output: 
Visit Vitals /67 (BP 1 Location: Right arm, BP Patient Position: At rest;Sitting) Pulse 85 Temp 97.6 °F (36.4 °C) Resp 16 Ht 5' 9\" (1.753 m) Wt 60.8 kg (134 lb) SpO2 100% BMI 19.79 kg/m² Current Shift:  03/21 0701 - 03/21 1900 In: 240 [P.O.:240] Out: - Last three shifts:  No intake/output data recorded. Exam: 
 
General: frail thin AAOX3 Wm, NAD 
CVS:Regular rate and rhythm, no M/R/G, S1/S2 heard, no thrill Lungs:Clear to auscultation bilaterally, no wheezes, rhonchi, or rales Abdomen: Soft, Nontender, No distention, Normal Bowel sounds, No hepatomegaly Extremities: chronic stasis edema LE BL, with some weeping, mild erythema LLE Neuro:grossly normal , follows commands Psych:appropriate Labs: Results:  
   
Chemistry Recent Labs  
  03/21/19 
0530 03/20/19 1955 GLU 78 73*  141  
K 3.3* 4.0  
 103 CO2 34* 35* BUN 22* 25* CREA 1.04 1.13  
CA 8.4* 8.5 AGAP 5 3 BUCR 21* 22* * 325* TP 6.5 7.2 ALB 2.7* 3.0*  
GLOB 3.8 4.2* AGRAT 0.7* 0.7* CBC w/Diff Recent Labs  
  03/21/19 
0530 03/20/19 1955 WBC 4.6 5.3  
RBC 4.21* 4.21* HGB 14.2 14.4 HCT 41.7 42.0 * 132* GRANS 68 75* LYMPH 20* 14* EOS 0 0 Cardiac Enzymes Recent Labs  
  03/21/19 
1145 03/21/19 
0530 CPK 80 65 CKND1 4.0 3.7 Coagulation No results for input(s): PTP, INR, APTT in the last 72 hours. No lab exists for component: INREXT, INREXT Lipid Panel No results found for: CHOL, CHOLPOCT, CHOLX, CHLST, CHOLV, 047302, HDL, LDL, LDLC, DLDLP, 460477, VLDLC, VLDL, TGLX, TRIGL, TRIGP, TGLPOCT, CHHD, CHHDX  
BNP No results for input(s): BNPP in the last 72 hours. Liver Enzymes Recent Labs  
  03/21/19 0530  
TP 6.5 ALB 2.7* * SGOT 39* Thyroid Studies No results found for: T4, T3U, TSH, TSHEXT, TSHEXT Procedures/imaging: see electronic medical records for all procedures/Xrays and details which were not copied into this note but were reviewed prior to creation of Plan Martín Todd MD

## 2019-03-21 NOTE — PROGRESS NOTES
DC Plan: TBD Chart reviewed. Pt admitted for CHF. Met with pt at bedside, no friend/family present. Primary RN Ciera Orozco present. Pt states he lives at [address] [place] [address] for last 4 years. Awaiting PT/OT recommendations. Pt agreeable to either HH or rehab, depending on recommendations. FOC offered and [facility] Pt states he has a RW, cane. Pt states he primarily uses cane. Pt expresses concern RW is too short for him. No other concerns identified. CM will cont to follow. Reason for Admission:  Per H&P, pt \"is a 80 y.o.  male who who has hx of CHF ef of 30/CAD Afib presents with worsening edema and weakness for last month Has had leg swelling and had diuretics adjusted given outpatient antibiotic with reaction for leg edema/rash Patient denies chest pain or palpitation has been less mobile so denies exertional dyspnea; patient has a big 89 th birth day party for the 24th and would like to be better and discharged prior to this\" RRAT Score:     18 mod Do you (patient/family) have any concerns for transition/discharge? Getting dc by Sunday to celebrate his birthday, RW too short [facility]  V re[facility] Likelihood of readmission?   mod Transition of Care Plan:      TBD Care Management Interventions Mode of Transport at Discharge: Other (see comment)(family v BLS) Transition of Care Consult (CM Consult): Discharge Planning Current Support Network: Assisted Living Plan discussed with Pt/Family/Caregiver: Yes Freedom of Choice Offered: Yes Discharge Location Discharge Placement: Home with home health(V REHAB)

## 2019-03-21 NOTE — PROGRESS NOTES
Problem: Mobility Impaired (Adult and Pediatric) Goal: *Acute Goals and Plan of Care (Insert Text) Description Physical Therapy Goals Initiated 3/21/2019 and to be accomplished within 1-2 day(s) 1. Patient will move from supine <> sit with S in prep for out of bed activity and change of position. 2.  Patient will perform sit<> stand with S with LRAD in prep for transfers/ambulation. 3.  Patient will transfer from bed <> chair with S with LRAD for time up in chair for completion of ADL activity. 4.  Patient will ambulate 150 feet with LRAD/S for improved functional mobility/safe discharge. Outcome: Resolved/Met PHYSICAL THERAPY EVALUATION & DISCHARGE Patient: Rupinder Uriostegui (10 y.o. male) Date: 3/21/2019 Primary Diagnosis: CHF (congestive heart failure) ([facility] [address] [I50.9] CHF (congestive heart failure) ([facility] [address] [I50.9] Precautions:Fall ASSESSMENT AND RECOMMENDATIONS: 
Based on the objective data described below, the patient presents with decrease independence w/ bed mobility, transfers, gait, and step negotiation. Pt seen sitting at the EOB prior to session w/ telemonitor donned. Pt reported no pain at this time in L LE. Pt reports that he was Mod I w/ RW prior to admittance. Pt reports his RW is too small for him and requested to have a new RW. Pt has met all goals at this time. Pt able to ambulate 300 ft w/ RW/GB w/ no signs of LOB at this time. Pt has not HARRY so steps were not assessed. Pt transferred back to room where pt was left sitting at the EOB, call bell and tray in reach, nurse notified after session. Pt has met all goals at this time, DC from acute PT. Skilled physical therapy is not indicated at this time. Discharge Recommendations: Home Health Further Equipment Recommendations for Discharge: rolling walker SUBJECTIVE:  
Patient stated ? I feel pretty darn good. ? OBJECTIVE DATA SUMMARY:  
 
Past Medical History:  
Diagnosis Date CHF (congestive heart failure) ([facility] [address] Hypertension History reviewed. No pertinent surgical history. Barriers to Learning/Limitations: yes;  physical 
Compensate with: visual, verbal, tactile, kinesthetic cues/model Prior Level of Function/Home Situation:  
Home Situation Home Environment: Independent living([facility]) # Steps to Enter: 0 One/Two Story Residence: One story Living Alone: No 
Support Systems: Other (comments)(independent living) Patient Expects to be Discharged to[de-identified] Private residence Current DME Used/Available at Home: Walker, rolling Critical Behavior: 
Neurologic State: Alert Orientation Level: Oriented X4 Psychosocial 
Purposeful Interaction: Yes Pt Identified Daily Priority: Clinical issues (comment) Caritas Process: Establish trust 
Caring Interventions: Reassure Reassure: Informing Skin Condition/Temp: Dry;Warm 
Skin Integrity: Excoriation;Blister; [place] Skin Integumentary Skin Color: Red(blanchable, BLE) Skin Condition/Temp: Dry;Warm 
Skin Integrity: Excoriation;Blister; [place] Turgor: Epidermis thin w/ loss of subcut tissue Hair Growth: Present Varicosities: Absent Strength:   
Strength: Generally decreased, functional 
Tone & Sensation:  
Sensation: Intact Range Of Motion: 
AROM: Generally decreased, functional 
Functional Mobility: 
Bed Mobility: 
Supine to Sit: Independent Sit to Supine: Independent Transfers: 
Sit to Stand: Modified independent Stand to Sit: Modified independent Balance:  
Sitting: Intact Standing: Intact; With support Ambulation/Gait Training: 
Distance (ft): 300 Feet (ft) Assistive Device: Gait belt;Walker, rolling Ambulation - Level of Assistance: Modified independent;Supervision Gait Description (WDL): Exceptions to [facility] Gait Abnormalities: Decreased step clearance Base of Support: Narrowed Speed/Divya: Slow Step Length: Left shortened;Right shortened Swing Pattern: Left asymmetrical;Right asymmetrical 
 
Pain: Pain Scale 1: Numeric (0 - 10) Pain Intensity 1: 0 Activity Tolerance:  
good Please refer to the flowsheet for vital signs taken during this treatment. After treatment:  
?         Patient left in no apparent distress sitting up in chair ? Patient left in no apparent distress in bed (sitting at the EOB) ? Call bell left within reach ? Nursing notified ? Caregiver present (spouse) ? Bed alarm activated COMMUNICATION/EDUCATION:  
?         Fall prevention education was provided and the patient/caregiver indicated understanding. ? Patient/family have participated as able in goal setting and plan of care. ?         Patient/family agree to work toward stated goals and plan of care. ?         Patient understands intent and goals of therapy, but is neutral about his/her participation. ? Patient is unable to participate in goal setting and plan of care. Thank you for this referral. 
Antony Clayton, PT Time Calculation: 13 mins

## 2019-03-21 NOTE — WOUND CARE
Wound Care Progress Note New consult placed by Cherrie De La O MD for \"stasis edema and ulcerations\" Assessment:  
Communication: A&O x 4 communicative Continent Independently repositions Diet: cardiac diet Patient reports no pain. Bilateral heel, buttocks, and sacral skin intact and without erythema. Generalized edema and blanching erythema to lower legs and feet. 1. POA venous ulcer, left anterior lower leg (wound location & etiology) = 0.5 x 0.5 x 0.1 cm Base is 100% of fibrinous tissue. Small serous exudate. Periwound intact & without erythema. Treatment: Promogran, ABO, Mepitel one, ABD pad, kerlix, acewrap 2. POA venous ulcer, left lateral lower leg (wound location & etiology) = 0.5 x 0.5 x 0.1 cm Base is 100% of granulation tissue. Small serous exudate. Periwound intact & without erythema. Treatment: Promogran, ABO, Mepitel one, ABD pad, kerlix, acewrap Wound Recommendations:   
Leave dressing in place until wound clinic follow up Wednesday of next week. Pt provided with appointment card and instructions of how to get to clinic. Skin Care & Pressure Relief Recommendations: 
Minimize layers of linen/pads under patient to optimize support surface. Turn/reposition approximately every 2 hours and offload heels pillows or Prevalon boots. Manage incontinence / promote continence; Proshield to buttocks and sacrum daily and as needed with incontinence care Discussed above plan with patient & Glenn Forde MD 
 
Transition of Care: Plan to follow weekly and per product recommendations while admitted to hospital.

## 2019-03-21 NOTE — H&P
History & Physical 
Patient: Tessa Sanders MRN: [id number]  CSN: [id number] YOB: 1929  Age: 80 y.o. Sex: male DOA: 3/20/2019 Chief Complaint:  
Chief Complaint Patient presents with  Leg Injury HPI:  
 
Tessa Sanders is a 80 y.o.  male who who has hx of CHF ef of 30/CAD Afib presents with worsening edema and weakness for last month Has had leg swelling and had diuretics adjusted given outpatient antibiotic with reaction for leg edema/rash Patient denies chest pain or palpitation has been less mobile so denies exertional dyspnea; patient has a big 89 th birth day party for the 24th and would like to be better and discharged prior to this ; Past Medical History:  
Diagnosis Date  CHF (congestive heart failure) ([facility] [address]  Hypertension History reviewed. No pertinent surgical history. History reviewed. No pertinent family history. Social History Socioeconomic History  Marital status:  Spouse name: Not on file  Number of children: Not on file  Years of education: Not on file  Highest education level: Not on file Tobacco Use  Smoking status: Never Smoker  Smokeless tobacco: Never Used Substance and Sexual Activity  Alcohol use: Yes Frequency: Never Comment: socially  Drug use: Never Prior to Admission medications Medication Sig Start Date End Date Taking? Authorizing Provider  
aspirin 81 mg chewable tablet Take 81 mg by mouth daily. Yes Other, MD Ever  
folic acid/multivit-min/lutein (CENTRUM SILVER PO) Take  by mouth. Yes Other, MD Ever  
digoxin (LANOXIN) 0.125 mg tablet Take 0.125 mg by mouth daily. Yes Rosina, MD Ever  
apixaban (ELIQUIS) 2.5 mg tablet Take 2.5 mg by mouth two (2) times a day. Yes Rosina, MD Ever  
metoprolol succinate (TOPROL-XL) 100 mg tablet Take 100 mg by mouth daily.    Yes Ever Almaguer MD  
 fluoride, sodium, (PREVIDENT 5000 PLUS) 1.1 % crea by Dental route. Yes Other, MD Ever  
simvastatin (ZOCOR) 20 mg tablet Take 20 mg by mouth nightly. Yes Other, MD Ever  
torsemide (DEMADEX) 10 mg tablet Take 10 mg by mouth daily. Takes 5 mg every other day   Yes Other, MD Ever  
cholecalciferol, vitamin D3, (VITAMIN D3) 2,000 unit tab Take  by mouth. Yes Other, MD Ever  
 
 
No Known Allergies Review of Systems GENERAL: Patient alert, awake and oriented times 3, able to communicate full sentences and not in distress. HEENT: No change in vision, no earache, tinnitus, sore throat or sinus congestion. NECK: No pain or stiffness. PULMONARY: No shortness of breath, cough or wheeze. Cardiovascular: no pnd or orthopnea, no CP GASTROINTESTINAL: No abdominal pain, nausea, vomiting or diarrhea, melena or bright red blood per rectum. GENITOURINARY: No urinary frequency, urgency, hesitancy or dysuria. MUSCULOSKELETAL: No joint or muscle pain, no back pain, no recent trauma. DERMATOLOGIC+rash, no itching, + lesions. ENDOCRINE: No polyuria, polydipsia, no heat or cold intolerance. +recent change in weight. Has stopped exercising to build muscle HEMATOLOGICAL: No anemia or easy bruising or bleeding. NEUROLOGIC: No headache, seizures, numbness, tingling+ weakness. Recent Results (from the past 24 hour(s)) CBC WITH AUTOMATED DIFF Collection Time: 03/20/19  7:55 PM  
Result Value Ref Range WBC 5.3 4.6 - 13.2 K/uL  
 RBC 4.21 (L) 4.70 - 5.50 M/uL  
 HGB 14.4 13.0 - 16.0 g/dL HCT 42.0 36.0 - 48.0 % MCV 99.8 (H) 74.0 - 97.0 FL  
 MCH 34.2 (H) 24.0 - 34.0 PG  
 MCHC 34.3 31.0 - 37.0 g/dL  
 RDW 16.4 (H) 11.6 - 14.5 % PLATELET 866 (L) 122 - 420 K/uL MPV 12.8 (H) 9.2 - 11.8 FL  
 NEUTROPHILS 75 (H) 40 - 73 % LYMPHOCYTES 14 (L) 21 - 52 % MONOCYTES 10 3 - 10 % EOSINOPHILS 0 0 - 5 % BASOPHILS 1 0 - 2 %  
 ABS. NEUTROPHILS 4.0 1.8 - 8.0 K/UL ABS. LYMPHOCYTES 0.8 (L) 0.9 - 3.6 K/UL  
 ABS. MONOCYTES 0.5 0.05 - 1.2 K/UL  
 ABS. EOSINOPHILS 0.0 0.0 - 0.4 K/UL  
 ABS. BASOPHILS 0.0 0.0 - 0.1 K/UL  
 DF AUTOMATED METABOLIC PANEL, COMPREHENSIVE Collection Time: 03/20/19  7:55 PM  
Result Value Ref Range Sodium 141 136 - 145 mmol/L Potassium 4.0 3.5 - 5.5 mmol/L Chloride 103 100 - 108 mmol/L  
 CO2 35 (H) 21 - 32 mmol/L Anion gap 3 3.0 - 18 mmol/L Glucose 73 (L) 74 - 99 mg/dL BUN 25 (H) 7.0 - 18 MG/DL Creatinine 1.13 0.6 - 1.3 MG/DL  
 BUN/Creatinine ratio 22 (H) 12 - 20 GFR est AA >60 >60 ml/min/1.73m2 GFR est non-AA >60 >60 ml/min/1.73m2 Calcium 8.5 8.5 - 10.1 MG/DL Bilirubin, total 1.0 0.2 - 1.0 MG/DL  
 ALT (SGPT) 34 16 - 61 U/L  
 AST (SGOT) 49 (H) 15 - 37 U/L Alk. phosphatase 325 (H) 45 - 117 U/L Protein, total 7.2 6.4 - 8.2 g/dL Albumin 3.0 (L) 3.4 - 5.0 g/dL Globulin 4.2 (H) 2.0 - 4.0 g/dL A-G Ratio 0.7 (L) 0.8 - 1.7 CARDIAC PANEL,(CK, CKMB & TROPONIN) Collection Time: 03/20/19  7:55 PM  
Result Value Ref Range CK 80 39 - 308 U/L  
 CK - MB 2.8 <3.6 ng/ml CK-MB Index 3.5 0.0 - 4.0 % Troponin-I, QT 0.09 (H) 0.0 - 0.045 NG/ML  
NT-PRO BNP Collection Time: 03/20/19  8:30 PM  
Result Value Ref Range NT pro-BNP 17,709 (H) 0 - 1,800 PG/ML  
EKG, 12 LEAD, INITIAL Collection Time: 03/20/19  9:50 PM  
Result Value Ref Range Ventricular Rate 102 BPM  
 Atrial Rate 122 BPM  
 P-R Interval 200 ms QRS Duration 182 ms Q-T Interval 420 ms QTC Calculation (Bezet) 547 ms Calculated R Axis -25 degrees Calculated T Axis 160 degrees Diagnosis Sinus tachycardia with occasional premature ventricular complexes Left bundle branch block Abnormal ECG No previous ECGs available Physical Exam:  
 
Physical Exam: 
Visit Vitals /78 Pulse 83 Temp 97.6 °F (36.4 °C) Resp 14 Ht 5' 9\" (1.753 m) Wt 60.8 kg (134 lb) SpO2 100% BMI 19.79 kg/m² O2 Device: Room air Temp (24hrs), Av.8 °F (36.6 °C), Min:97.6 °F (36.4 °C), Max:98.1 °F (36.7 °C) No intake/output data recorded. No intake/output data recorded. General:  Alert, cooperative, no distress, appears stated age. Head: Normocephalic, without obvious abnormality, atraumatic. Eyes:  Conjunctivae/corneas clear. PERRL, EOMs intact. Nose: Nares normal. No drainage or sinus tenderness. Neck: Supple, symmetrical, trachea midline, no adenopathy, thyroid: no enlargement, no carotid bruit and no JVD. Lungs:   Clear to auscultation bilaterally. diminished at base Heart :   irreg Regular rate and rhythm, S1, S2 normal.  
  Abdomen: Soft, non-tender. Bowel sounds normal.   
Extremities: Extremities normal, atraumatic, no cyanosis +2edema. Erythema noted but no warmth or blanching not cellulitis Pulses: 2+ and symmetric all extremities. Skin:  ulceration leg 2 mall less than a dime Neurologic: AAOx3, No focal motor or sensory deficit. Labs Reviewed: All lab results for the last 24 hours reviewed. Recent Results (from the past 24 hour(s)) CBC WITH AUTOMATED DIFF Collection Time: 19  7:55 PM  
Result Value Ref Range WBC 5.3 4.6 - 13.2 K/uL  
 RBC 4.21 (L) 4.70 - 5.50 M/uL  
 HGB 14.4 13.0 - 16.0 g/dL HCT 42.0 36.0 - 48.0 % MCV 99.8 (H) 74.0 - 97.0 FL  
 MCH 34.2 (H) 24.0 - 34.0 PG  
 MCHC 34.3 31.0 - 37.0 g/dL  
 RDW 16.4 (H) 11.6 - 14.5 % PLATELET 630 (L) 712 - 420 K/uL MPV 12.8 (H) 9.2 - 11.8 FL  
 NEUTROPHILS 75 (H) 40 - 73 % LYMPHOCYTES 14 (L) 21 - 52 % MONOCYTES 10 3 - 10 % EOSINOPHILS 0 0 - 5 % BASOPHILS 1 0 - 2 %  
 ABS. NEUTROPHILS 4.0 1.8 - 8.0 K/UL  
 ABS. LYMPHOCYTES 0.8 (L) 0.9 - 3.6 K/UL  
 ABS. MONOCYTES 0.5 0.05 - 1.2 K/UL  
 ABS. EOSINOPHILS 0.0 0.0 - 0.4 K/UL  
 ABS. BASOPHILS 0.0 0.0 - 0.1 K/UL  
 DF AUTOMATED METABOLIC PANEL, COMPREHENSIVE  Collection Time: 19  7:55 PM  
 Result Value Ref Range Sodium 141 136 - 145 mmol/L Potassium 4.0 3.5 - 5.5 mmol/L Chloride 103 100 - 108 mmol/L  
 CO2 35 (H) 21 - 32 mmol/L Anion gap 3 3.0 - 18 mmol/L Glucose 73 (L) 74 - 99 mg/dL BUN 25 (H) 7.0 - 18 MG/DL Creatinine 1.13 0.6 - 1.3 MG/DL  
 BUN/Creatinine ratio 22 (H) 12 - 20 GFR est AA >60 >60 ml/min/1.73m2 GFR est non-AA >60 >60 ml/min/1.73m2 Calcium 8.5 8.5 - 10.1 MG/DL Bilirubin, total 1.0 0.2 - 1.0 MG/DL  
 ALT (SGPT) 34 16 - 61 U/L  
 AST (SGOT) 49 (H) 15 - 37 U/L Alk. phosphatase 325 (H) 45 - 117 U/L Protein, total 7.2 6.4 - 8.2 g/dL Albumin 3.0 (L) 3.4 - 5.0 g/dL Globulin 4.2 (H) 2.0 - 4.0 g/dL A-G Ratio 0.7 (L) 0.8 - 1.7 CARDIAC PANEL,(CK, CKMB & TROPONIN) Collection Time: 03/20/19  7:55 PM  
Result Value Ref Range CK 80 39 - 308 U/L  
 CK - MB 2.8 <3.6 ng/ml CK-MB Index 3.5 0.0 - 4.0 % Troponin-I, QT 0.09 (H) 0.0 - 0.045 NG/ML  
NT-PRO BNP Collection Time: 03/20/19  8:30 PM  
Result Value Ref Range NT pro-BNP 17,709 (H) 0 - 1,800 PG/ML  
EKG, 12 LEAD, INITIAL Collection Time: 03/20/19  9:50 PM  
Result Value Ref Range Ventricular Rate 102 BPM  
 Atrial Rate 122 BPM  
 P-R Interval 200 ms QRS Duration 182 ms Q-T Interval 420 ms QTC Calculation (Bezet) 547 ms Calculated R Axis -25 degrees Calculated T Axis 160 degrees Diagnosis Sinus tachycardia with occasional premature ventricular complexes Left bundle branch block Abnormal ECG No previous ECGs available 
  
 and EKG Procedures/imaging: see electronic medical records for all procedures/Xrays and details which were not copied into this note but were reviewed prior to creation of Plan Assessment/Plan Principal Problem: 
  CHF (congestive heart failure) ([facility] [address] (3/20/2019) Hold home Demadex IV lasix q 12 monitor electrolytes Check mag/pot in am  
Check echo Active Problems: Hypoglycemia (3/20/2019) Place on glucose checks with sliding scale no DM hx Check a1c likely IGT Stasis Edema with ulceration Wound care consult No need for antibiotics no wbc/ no heat or blanching (adverse effect with prior abxs) CKD Creatine 1.1 now Not on arb due to worsenig creatine Will add low dose but see how he tolerates Lasix IV Consider lorsartan 25mg daily not started LBB Check enzymes Chronic no chest pain AFib Rate control with metoprolol Check digoxin level Cont digoxin Resume AC with Eloquis DVT/GI Prophylaxis: Eloquis 
 
antipate d/c in 2- 3 days Discussed with patient at bedside about hospital admission and my plan care, who understood and agree with my plan care.  
 
Chance Billings MD 
3/21/2019 11:02 PM

## 2019-03-21 NOTE — PROGRESS NOTES
conducted an initial consultation and Spiritual Assessment for Mary Hardy, who is a 80 y.o.,male. According to the patients chart Pent[employer]alism Affiliation is: Lutheran. The reason the Patient came to the hospital is:  
Patient Active Problem List  
 Diagnosis Date Noted  Venous insufficiency of both lower extremities 03/21/2019  CHF (congestive heart failure) ([facility] [address] 03/20/2019  Hypoglycemia 03/20/2019  Chronic anemia 05/23/2018  Chronic systolic congestive heart failure ([facility] [address] 03/14/2018  Acute combined systolic and diastolic heart failure ([facility] [address] 12/04/2017  Atrial fibrillation ([facility] [address] 08/25/2017  History of aortic valve replacement 05/24/2016  History of coronary artery bypass surgery 05/24/2016  Aortic valve stenosis 01/05/2011  Premature atrial contraction 09/18/2009  Left bundle branch block (LBBB) 09/18/2009  Essential hypertension 07/02/1999 Initiated a relationship of care and support. Provided information about Spiritual Care Services. Offered prayer and assurance of continued prayers on patients behalf. Offered Sacrament of the [address] [facility]. Plan: 
Chaplains will continue to follow and will provide pastoral care as needed or requested.  recommends bedside caregivers page  on duty if patient shows signs of acute spiritual or emotional distress. Father KIRAN [place]. Div [id number] [facility] - Office

## 2019-03-21 NOTE — ROUTINE PROCESS
Bedside and Verbal shift change report given to Colby Ramirez (oncoming nurse) by Lucille Yeboah RN 
 (offgoing nurse). Report included the following information SBAR, Kardex and MAR.

## 2019-03-21 NOTE — PROGRESS NOTES
Problem: Falls - Risk of 
Goal: *Absence of Falls Description Document Estee Fearing Fall Risk and appropriate interventions in the flowsheet. Outcome: Progressing Towards Goal 
  
Problem: Patient Education: Go to Patient Education Activity Goal: Patient/Family Education Outcome: Progressing Towards Goal 
  
Problem: Pressure Injury - Risk of 
Goal: *Prevention of pressure injury Description Document [place]o Scale and appropriate interventions in the flowsheet. Outcome: Progressing Towards Goal 
  
Problem: Patient Education: Go to Patient Education Activity Goal: Patient/Family Education Outcome: Progressing Towards Goal

## 2019-03-21 NOTE — PROGRESS NOTES
0030: Pt arrived to the unit. Appears to be in stable condition. No acute distress noted. Wife at bedside along with Dr. Lay's Pride who came to assess pt. No need to give additional dose of lasix per Dr. Lay's Pride. Will monitor. 0100: Pt ambulated with walker and assistance. Tolerated well. 0200: Pt resting. No acute changes noted. 0600: Uneventful shift. No acute changes noted.

## 2019-03-22 ENCOUNTER — HOME HEALTH ADMISSION (OUTPATIENT)
Dept: HOME HEALTH SERVICES | Facility: HOME HEALTH | Age: 84
End: 2019-03-22
Payer: MEDICARE

## 2019-03-22 VITALS
OXYGEN SATURATION: 100 % | RESPIRATION RATE: 18 BRPM | DIASTOLIC BLOOD PRESSURE: 81 MMHG | WEIGHT: 126.9 LBS | BODY MASS INDEX: 18.8 KG/M2 | TEMPERATURE: 97.3 F | SYSTOLIC BLOOD PRESSURE: 106 MMHG | HEIGHT: 69 IN | HEART RATE: 108 BPM

## 2019-03-22 PROBLEM — I50.43 ACUTE ON CHRONIC COMBINED SYSTOLIC AND DIASTOLIC CONGESTIVE HEART FAILURE (HCC): Status: ACTIVE | Noted: 2019-03-20

## 2019-03-22 LAB
ANION GAP SERPL CALC-SCNC: 6 MMOL/L (ref 3–18)
BUN SERPL-MCNC: 25 MG/DL (ref 7–18)
BUN/CREAT SERPL: 22 (ref 12–20)
CALCIUM SERPL-MCNC: 8.6 MG/DL (ref 8.5–10.1)
CHLORIDE SERPL-SCNC: 101 MMOL/L (ref 100–108)
CO2 SERPL-SCNC: 33 MMOL/L (ref 21–32)
CREAT SERPL-MCNC: 1.15 MG/DL (ref 0.6–1.3)
ERYTHROCYTE [DISTWIDTH] IN BLOOD BY AUTOMATED COUNT: 16.4 % (ref 11.6–14.5)
GLUCOSE BLD STRIP.AUTO-MCNC: 74 MG/DL (ref 70–110)
GLUCOSE SERPL-MCNC: 72 MG/DL (ref 74–99)
HCT VFR BLD AUTO: 44.7 % (ref 36–48)
HGB BLD-MCNC: 14.5 G/DL (ref 13–16)
MCH RBC QN AUTO: 32.9 PG (ref 24–34)
MCHC RBC AUTO-ENTMCNC: 32.4 G/DL (ref 31–37)
MCV RBC AUTO: 101.4 FL (ref 74–97)
PLATELET # BLD AUTO: 168 K/UL (ref 135–420)
PMV BLD AUTO: 12.6 FL (ref 9.2–11.8)
POTASSIUM SERPL-SCNC: 4.3 MMOL/L (ref 3.5–5.5)
RBC # BLD AUTO: 4.41 M/UL (ref 4.7–5.5)
SODIUM SERPL-SCNC: 140 MMOL/L (ref 136–145)
WBC # BLD AUTO: 4.9 K/UL (ref 4.6–13.2)

## 2019-03-22 PROCEDURE — 82962 GLUCOSE BLOOD TEST: CPT

## 2019-03-22 PROCEDURE — 80048 BASIC METABOLIC PNL TOTAL CA: CPT

## 2019-03-22 PROCEDURE — 74011250637 HC RX REV CODE- 250/637: Performed by: INTERNAL MEDICINE

## 2019-03-22 PROCEDURE — 85027 COMPLETE CBC AUTOMATED: CPT

## 2019-03-22 PROCEDURE — 36415 COLL VENOUS BLD VENIPUNCTURE: CPT

## 2019-03-22 PROCEDURE — 74011250636 HC RX REV CODE- 250/636: Performed by: INTERNAL MEDICINE

## 2019-03-22 PROCEDURE — 74011250637 HC RX REV CODE- 250/637: Performed by: HOSPITALIST

## 2019-03-22 RX ORDER — SAME BUTANEDISULFONATE/BETAINE 400-600 MG
500 POWDER IN PACKET (EA) ORAL 2 TIMES DAILY
Qty: 28 CAP | Refills: 0 | Status: SHIPPED | OUTPATIENT
Start: 2019-03-22 | End: 2019-03-29

## 2019-03-22 RX ORDER — AMOXICILLIN AND CLAVULANATE POTASSIUM 500; 125 MG/1; MG/1
1 TABLET, FILM COATED ORAL 2 TIMES DAILY
Qty: 14 TAB | Refills: 0 | Status: SHIPPED | OUTPATIENT
Start: 2019-03-22 | End: 2019-03-29

## 2019-03-22 RX ORDER — LISINOPRIL 2.5 MG/1
2.5 TABLET ORAL DAILY
Qty: 30 TAB | Refills: 0 | Status: SHIPPED | OUTPATIENT
Start: 2019-03-22

## 2019-03-22 RX ADMIN — ASPIRIN 81 MG 81 MG: 81 TABLET ORAL at 08:14

## 2019-03-22 RX ADMIN — APIXABAN 2.5 MG: 2.5 TABLET, FILM COATED ORAL at 08:14

## 2019-03-22 RX ADMIN — METOPROLOL SUCCINATE 100 MG: 100 TABLET, EXTENDED RELEASE ORAL at 08:15

## 2019-03-22 RX ADMIN — POTASSIUM CHLORIDE 40 MEQ: 20 TABLET, EXTENDED RELEASE ORAL at 08:14

## 2019-03-22 RX ADMIN — MULTIPLE VITAMINS W/ MINERALS TAB 1 TABLET: TAB at 08:14

## 2019-03-22 RX ADMIN — DIGOXIN 0.12 MG: 125 TABLET ORAL at 08:14

## 2019-03-22 RX ADMIN — FUROSEMIDE 40 MG: 10 INJECTION, SOLUTION INTRAMUSCULAR; INTRAVENOUS at 08:15

## 2019-03-22 NOTE — HOME CARE
Demographics confirmed and VIP card left. Pt and Family questions answered. Walker given and paper sent to [facility]. Called to intake. [address] [place]  THE [facility] Nurse Liaison [facility] [phone]

## 2019-03-22 NOTE — PROGRESS NOTES
0700: Assumed care of pt from [facility] 
1100: Educated pt on CHF fluid intake/ salt intake daily weights and medication compliance as well as new medication and side effects. 1130: Pt walker form home is too low Charley  stated they will bring up a taller walker for pt.

## 2019-03-22 NOTE — ROUTINE PROCESS
Bedside and Verbal shift change report given to [address] Trinidad (oncoming nurse) by Marlen Fry RN 
 (offgoing nurse). Report included the following information SBAR, Kardex and MAR.

## 2019-03-22 NOTE — PROGRESS NOTES
Pt had a 12 beat run of vtach. Pt appears to be in stable condition. No acute distress noted. Pt resting at this time. Notified Dr. Jadiel Barajas. No new orders received.

## 2019-03-22 NOTE — DISCHARGE INSTRUCTIONS
Patient Education        ACE Inhibitors: Care Instructions  Your Care Instructions    ACE (angiotensin-converting enzyme) inhibitors lower blood pressure. They also treat heart failure and prevent heart attacks and strokes. They block an enzyme that makes blood vessels narrow. As a result, the blood vessels relax and widen. This lowers blood pressure. These medicines also put more water and salt into the urine. This lowers blood pressure too. These medicines are a good choice for people with diabetes. They don't affect blood sugar levels, and they may protect the kidneys. Examples include:  · Benazepril (Lotensin). · Lisinopril (Prinivil, Zestril). · Ramipril (Altace). Before you start taking this medicine, make sure your doctor knows if you take other medicines, especially water pills (diuretics) or potassium tablets. And tell your doctor if you use a salt substitute. You should not take an ACE inhibitor if you are pregnant or planning to become pregnant. You may need regular blood tests. Follow-up care is a key part of your treatment and safety. Be sure to make and go to all appointments, and call your doctor if you are having problems. It's also a good idea to know your test results and keep a list of the medicines you take. How can you care for yourself at home? · Take your medicines exactly as prescribed. Be sure to take high blood pressure medicine every day. Since high blood pressure often has no symptoms, it is easy to forget to take the pills. Call your doctor if you think you are having a problem with your medicine. · ACE inhibitors can cause a dry cough. If the cough is bad, talk to your doctor. You may need to try a different medicine. · ACE inhibitors can cause an allergic reaction of the skin. Symptoms may range from mild swelling to painful welts. Severe swelling can make it hard to breathe, but this is very rare. · Check with your doctor or pharmacist before you use any other medicines. This includes over-the-counter medicines. Make sure your doctor knows all of the medicines, vitamins, herbal products, and supplements you take. Taking some medicines together can cause problems. When should you call for help? Call your doctor now or seek immediate medical care if:    · You develop a new cough.     · You think you are having problems with your medicine.    Watch closely for changes in your health, and be sure to contact your doctor if you have any problems. Where can you learn more? Go to http://maik-arian.info/. Enter Y158 in the search box to learn more about \"ACE Inhibitors: Care Instructions. \"  Current as of: July 22, 2018  Content Version: 11.9  © 5533-2751 [employer]. Care instructions adapted under license by [employer] (which disclaims liability or warranty for this information). If you have questions about a medical condition or this instruction, always ask your healthcare professional. [place] [address] any warranty or liability for your use of this information. Patient Education        Atrial Fibrillation: Care Instructions  Your Care Instructions    Atrial fibrillation is an irregular and often fast heartbeat. Treating this condition is important for several reasons. It can cause blood clots, which can travel from your heart to your brain and cause a stroke. If you have a fast heartbeat, you may feel lightheaded, dizzy, and weak. An irregular heartbeat can also increase your risk for heart failure. Atrial fibrillation is often the result of another heart condition, such as high blood pressure or coronary artery disease. Making changes to improve your heart condition will help you stay healthy and active. Follow-up care is a key part of your treatment and safety. Be sure to make and go to all appointments, and call your doctor if you are having problems.  It's also a good idea to know your test results and keep a list of the medicines you take. How can you care for yourself at home? Medicines    · Take your medicines exactly as prescribed. Call your doctor if you think you are having a problem with your medicine. You will get more details on the specific medicines your doctor prescribes.     · If your doctor has given you a blood thinner to prevent a stroke, be sure you get instructions about how to take your medicine safely. Blood thinners can cause serious bleeding problems.     · Do not take any vitamins, over-the-counter drugs, or herbal products without talking to your doctor first.    Lifestyle changes    · Do not smoke. Smoking can increase your chance of a stroke and heart attack. If you need help quitting, talk to your doctor about stop-smoking programs and medicines. These can increase your chances of quitting for good.     · Eat a heart-healthy diet.     · Stay at a healthy weight. Lose weight if you need to.     · Limit alcohol to 2 drinks a day for men and 1 drink a day for women. Too much alcohol can cause health problems.     · Avoid colds and flu. Get a pneumococcal vaccine shot. If you have had one before, ask your doctor whether you need another dose. Get a flu shot every year. If you must be around people with colds or flu, wash your hands often. Activity    · If your doctor recommends it, get more exercise. Walking is a good choice. Bit by bit, increase the amount you walk every day. Try for at least 30 minutes on most days of the week. You also may want to swim, bike, or do other activities. Your doctor may suggest that you join a cardiac rehabilitation program so that you can have help increasing your physical activity safely.     · Start light exercise if your doctor says it is okay. Even a small amount will help you get stronger, have more energy, and manage stress. Walking is an easy way to get exercise.  Start out by walking a little more than you did in the hospital. Gradually increase the amount you walk.     · When you exercise, watch for signs that your heart is working too hard. You are pushing too hard if you cannot talk while you are exercising. If you become short of breath or dizzy or have chest pain, sit down and rest immediately.     · Check your pulse regularly. Place two fingers on the artery at the palm side of your wrist, in line with your thumb. If your heartbeat seems uneven or fast, talk to your doctor. When should you call for help? Call 911 anytime you think you may need emergency care. For example, call if:    · You have symptoms of a heart attack. These may include:  ? Chest pain or pressure, or a strange feeling in the chest.  ? Sweating. ? Shortness of breath. ? Nausea or vomiting. ? Pain, pressure, or a strange feeling in the back, neck, jaw, or upper belly or in one or both shoulders or arms. ? Lightheadedness or sudden weakness. ? A fast or irregular heartbeat. After you call 911, the  may tell you to chew 1 adult-strength or 2 to 4 low-dose aspirin. Wait for an ambulance. Do not try to drive yourself.     · You have symptoms of a stroke. These may include:  ? Sudden numbness, tingling, weakness, or loss of movement in your face, arm, or leg, especially on only one side of your body. ? Sudden vision changes. ? Sudden trouble speaking. ? Sudden confusion or trouble understanding simple statements. ? Sudden problems with walking or balance. ? A sudden, severe headache that is different from past headaches.     · You passed out (lost consciousness).    Call your doctor now or seek immediate medical care if:    · You have new or increased shortness of breath.     · You feel dizzy or lightheaded, or you feel like you may faint.     · Your heart rate becomes irregular.     · You can feel your heart flutter in your chest or skip heartbeats.  Tell your doctor if these symptoms are new or worse.    Watch closely for changes in your health, and be sure to contact your doctor if you have any problems. Where can you learn more? Go to http://maik-arian.info/. Enter U020 in the search box to learn more about \"Atrial Fibrillation: Care Instructions. \"  Current as of: July 22, 2018  Content Version: 11.9  © 7842-1000 [employer]. Care instructions adapted under license by [employer] (which disclaims liability or warranty for this information). If you have questions about a medical condition or this instruction, always ask your healthcare professional. [place] [address] any warranty or liability for your use of this information. Patient Education        Learning About ACE Inhibitors for Heart Failure  Introduction    ACE (angiotensin-converting enzyme) inhibitors block an enzyme that makes blood vessels narrow. As a result, the blood vessels relax and widen. This lowers blood pressure. These medicines also put more water and salt into the urine. This also lowers blood pressure. In heart failure, your heart does not pump as much blood as your body needs. These medicines:  · Make it easier for your heart to pump. · Help reduce symptoms. · Make a heart attack or stroke less likely. Examples  These medicines include:  · Benazepril (Lotensin). · Lisinopril (Prinivil, Zestril). · Ramipril (Altace). This is not a complete list.  Possible side effects  Side effects may include:  · Cough. · Low blood pressure. You may feel dizzy and weak. · High potassium levels. · An allergic reaction. You may have other side effects or reactions not listed here. Check the information that comes with your medicine. What to know about taking this medicine  · ACE inhibitors:  ? Are often used to treat heart failure. They may be the only medicine used if your only symptoms are feeling tired and mildly short of breath with no fluid buildup (edema). But in most other cases, they are used with other medicines.   ? Can cause a dry cough. If the cough is bad, talk to your doctor. You may need to try a different medicine. ? Can relieve symptoms, improve how you feel, and make it less likely you will need to stay in a hospital. And they can reduce the risk of early death. ? Can cause an allergic reaction of the skin. Symptoms may range from mild swelling to painful welts. It is rare to have severe swelling that makes it hard to breathe. · Take your medicines exactly as prescribed. Call your doctor if you think you are having a problem with your medicine. · Check with your doctor or pharmacist before you use any other medicines. This includes over-the-counter medicines. Make sure your doctor knows all of the medicines, vitamins, herbal products, and supplements you take. Taking some medicines together can cause problems. · You should not take an ACE inhibitor if you are pregnant or planning to become pregnant. · You may need regular blood tests. Where can you learn more? Go to http://maik-arian.info/. Enter B041 in the search box to learn more about \"Learning About ACE Inhibitors for Heart Failure. \"  Current as of: July 22, 2018  Content Version: 11.9  © 3006-1731 [employer]. Care instructions adapted under license by [employer] (which disclaims liability or warranty for this information). If you have questions about a medical condition or this instruction, always ask your healthcare professional. [place] [address] any warranty or liability for your use of this information. Patient Education        Heart Failure: Care Instructions  Your Care Instructions    Heart failure occurs when your heart does not pump as much blood as the body needs. Failure does not mean that the heart has stopped pumping but rather that it is not pumping as well as it should. Over time, this causes fluid buildup in your lungs and other parts of your body.  Fluid buildup can cause shortness of breath, fatigue, swollen ankles, and other problems. By taking medicines regularly, reducing sodium (salt) in your diet, checking your weight every day, and making lifestyle changes, you can feel better and live longer. Follow-up care is a key part of your treatment and safety. Be sure to make and go to all appointments, and call your doctor if you are having problems. It's also a good idea to know your test results and keep a list of the medicines you take. How can you care for yourself at home? Medicines    · Be safe with medicines. Take your medicines exactly as prescribed. Call your doctor if you think you are having a problem with your medicine.     · Do not take any vitamins, over-the-counter medicine, or herbal products without talking to your doctor first. C[place]orro Carias not take ibuprofen (Advil or Motrin) and naproxen (Aleve) without talking to your doctor first. They could make your heart failure worse.     · You may be taking some of the following medicine. ? Beta-blockers can slow heart rate, decrease blood pressure, and improve your condition. Taking a beta-blocker may lower your chance of needing to be hospitalized. ? Angiotensin-converting enzyme inhibitors (ACEIs) reduce the heart's workload, lower blood pressure, and reduce swelling. Taking an ACEI may lower your chance of needing to be hospitalized again. ? Angiotensin II receptor blockers (ARBs) work like ACEIs. Your doctor may prescribe them instead of ACEIs. ? Diuretics, also called water pills, reduce swelling. ? Potassium supplements replace this important mineral, which is sometimes lost with diuretics. ? Aspirin and other blood thinners prevent blood clots, which can cause a stroke or heart attack.    You will get more details on the specific medicines your doctor prescribes. Diet    · Your doctor may suggest that you limit sodium to 2,000 milligrams (mg) a day or less.  That is less than 1 teaspoon of salt a day, including all the salt you eat in cooking or in packaged foods. People get most of their sodium from processed foods. Fast food and restaurant meals also tend to be very high in sodium.     · Ask your doctor how much liquid you can drink each day. You may have to limit liquids.    Weight    · Weigh yourself without clothing at the same time each day. Record your weight. Call your doctor if you have a sudden weight gain, such as more than 2 to 3 pounds in a day or 5 pounds in a week. (Your doctor may suggest a different range of weight gain.) A sudden weight gain may mean that your heart failure is getting worse.    Activity level    · Start light exercise (if your doctor says it is okay). Even if you can only do a small amount, exercise will help you get stronger, have more energy, and manage your weight and your stress. Walking is an easy way to get exercise. Start out by walking a little more than you did before. Bit by bit, increase the amount you walk.     · When you exercise, watch for signs that your heart is working too hard. You are pushing yourself too hard if you cannot talk while you are exercising. If you become short of breath or dizzy or have chest pain, stop, sit down, and rest.     · If you feel \"wiped out\" the day after you exercise, walk slower or for a shorter distance until you can work up to a better pace.     · Get enough rest at night. Sleeping with 1 or 2 pillows under your upper body and head may help you breathe easier.    Lifestyle changes    · Do not smoke. Smoking can make a heart condition worse. If you need help quitting, talk to your doctor about stop-smoking programs and medicines. These can increase your chances of quitting for good. Quitting smoking may be the most important step you can take to protect your heart.     · Limit alcohol to 2 drinks a day for men and 1 drink a day for women. Too much alcohol can cause health problems.     · Avoid getting sick from colds and the flu.  Get a pneumococcal vaccine shot. If you have had one before, ask your doctor whether you need another dose. Get a flu shot each year. If you must be around people with colds or the flu, wash your hands often. When should you call for help? Call 911 if you have symptoms of sudden heart failure such as:    · You have severe trouble breathing.     · You cough up pink, foamy mucus.     · You have a new irregular or rapid heartbeat.    Call your doctor now or seek immediate medical care if:    · You have new or increased shortness of breath.     · You are dizzy or lightheaded, or you feel like you may faint.     · You have sudden weight gain, such as more than 2 to 3 pounds in a day or 5 pounds in a week. (Your doctor may suggest a different range of weight gain.)     · You have increased swelling in your legs, ankles, or feet.     · You are suddenly so tired or weak that you cannot do your usual activities.    Watch closely for changes in your health, and be sure to contact your doctor if you develop new symptoms. Where can you learn more? Go to http://maikhopToarian.info/. Enter R147 in the search box to learn more about \"Heart Failure: Care Instructions. \"  Current as of: July 22, 2018  Content Version: 11.9  © 7977-8224 [employer]. Care instructions adapted under license by [employer] (which disclaims liability or warranty for this information). If you have questions about a medical condition or this instruction, always ask your healthcare professional. [place] [address] any warranty or liability for your use of this information. Patient Education        Learning About Heart Failure Zones  What are heart failure zones? Heart failure zones give you an easy way to see changes in your heart failure symptoms. They also tell you when you need to get help. Check every day to see which zone you are in. Green zone. You are doing well. This is where you want to be.   · Your weight is stable. This means it is not going up or down. · You breathe easily. · You are sleeping well. You are able to lie flat without shortness of breath. · You can do your usual activities. Yellow zone. Be careful. Your symptoms are changing. Call your doctor. · You have new or increased shortness of breath. · You are dizzy or lightheaded, or you feel like you may faint. · You have sudden weight gain, such as more than 2 to 3 pounds in a day or 5 pounds in a week. (Your doctor may suggest a different range of weight gain.)  · You have increased swelling in your legs, ankles, or feet. · You are so tired or weak that you cannot do your usual activities. · You are not sleeping well. Shortness of breath wakes you up at night. You need extra pillows. Your doctor's name: ____________________________________________________________  Your doctor's contact information: _________________________________________________  Red zone. This is an emergency. Call 911. You have symptoms of sudden heart failure, such as:  · You have severe trouble breathing. · You cough up pink, foamy mucus. · You have a new irregular or fast heartbeat. You have symptoms of a heart attack. These may include:  · Chest pain or pressure, or a strange feeling in the chest.  · Sweating. · Shortness of breath. · Nausea or vomiting. · Pain, pressure, or a strange feeling in the back, neck, jaw, or upper belly or in one or both shoulders or arms. · Lightheadedness or sudden weakness. · A fast or irregular heartbeat. If you have symptoms of a heart attack: After you call 911, the  may tell you to chew 1 adult-strength or 2 to 4 low-dose aspirin. Wait for an ambulance. Do not try to drive yourself. Follow-up care is a key part of your treatment and safety. Be sure to make and go to all appointments, and call your doctor if you are having problems.  It's also a good idea to know your test results and keep a list of the medicines you take. Where can you learn more? Go to http://maik-arian.info/. Enter T174 in the search box to learn more about \"Learning About Heart Failure Zones. \"  Current as of: July 22, 2018  Content Version: 11.9  © 4634-0381 [employer]. Care instructions adapted under license by [employer] (which disclaims liability or warranty for this information). If you have questions about a medical condition or this instruction, always ask your healthcare professional. [place] [address] any warranty or liability for your use of this information. Patient Education        Learning About Diuretics for High Blood Pressure  Introduction  Diuretics help to lower blood pressure. This reduces your risk of a heart attack and stroke. It also reduces your risk of kidney disease. Diuretics cause your kidneys to remove sodium and water. They also relax the blood vessel walls. These help lower your blood pressure. Examples  · Chlorthalidone  · Hydrochlorothiazide  Possible side effects  There are some common side effects. They are:  · Too little potassium. · Feeling dizzy. · Rash. · Urinating a lot. · High blood sugar. (But this is not common.)  You may have other side effects. Check the information that comes with your medicine. What to know about taking this medicine  · You may take other medicines for blood pressure. Diuretics can help those work better. They can also prevent extra fluid in your body. · You may need to take potassium pills. Or you may have to watch how much potassium is in your food. Ask your doctor about this. · You may need blood tests to check your kidneys and your potassium level. · Take your medicines exactly as prescribed. Call your doctor if you think you are having a problem with your medicine. · Check with your doctor or pharmacist before you use any other medicines. This includes over-the-counter medicines.  Make sure your doctor knows all of the medicines, vitamins, herbal products, and supplements you take. Taking some medicines together can cause problems. Where can you learn more? Go to http://maik-arian.info/. Enter M119 in the search box to learn more about \"Learning About Diuretics for High Blood Pressure. \"  Current as of: July 22, 2018  Content Version: 11.9  © 4645-7935 [employer]. Care instructions adapted under license by [employer] (which disclaims liability or warranty for this information). If you have questions about a medical condition or this instruction, always ask your healthcare professional. [place] [address] any warranty or liability for your use of this information. Patient Education        Advance Care Planning for Heart Failure: Care Instructions  Your Care Instructions    If you have heart failure, taking care of yourself will help you feel better and live longer. But the disease often gets worse over time. So it may be a good idea to plan for the future now while you are active and able to communicate your wishes. If you do this kind of planning, it does not mean that you are giving up. It's simply the best way to make sure that you get the care and treatment that you want. It can also make things much easier for your loved ones. What can you do to plan for the end of life? · Talk openly and honestly with your family and doctor. This is the best way to understand the decisions you will need to make as your health changes. Know that you can always change your mind. · Ask your doctor about common life-support treatments. These include tube feedings, breathing machines, and fluids given through a vein (IV). It may be easier to decide if you want them after you are sure you understand what they are. · Think about preparing written papers that state your wishes. These papers are called advance directives.  If you do this, there will not be any confusion about your wishes. · If you are near the end of your life and you have a heart device such as a pacemaker, talk to your doctor about turning it off. Include this in your advance directive. · Ask a friend or family member to make decisions for you when you no longer can. Talk to this person about the kinds of treatments you want or don't want. Make sure this person understands your wishes. · You may decide to try life-supporting treatments for a limited time. This can help your doctor see if they will help. You may also decide that you want your doctor to do only certain things to keep you alive. It's important to be very clear about what you do and don't want. · Ask your doctor for an estimate of how long you may live. Your doctor may not always know. But he or she can tell you what usually happens with heart failure. Which papers should you prepare? Advance directives are legal papers that tell doctors how to care for you at the end of your life. They may include a living will and a durable power of . You don't need a  to write these papers. If you prepare these papers, be sure to give a copy to your doctor. It's also important to give a copy to a family member or close friend. · Think about a do-not-resuscitate order, or DNR. This order asks that no extra treatments be used to save your life if your heart stops. These treatments include electrical shock to restart your heart. They also include a machine to breathe for you and medicines to save your life. If you decide to have a DNR order, ask your doctor to write it. Then put it in your home where everyone can see it. · Think about a living will. It explains your wishes in case you are in a coma or cannot communicate. Living silva tell doctors to use or not use treatments to keep you alive. You must have one or two witnesses or a notary in the room when you sign this form.   · Think about naming a person to make decisions about your care if you are not able to. This is called a durable power of . Most people ask a close friend or family member. · Ask your doctor or your state health department about advance directives. They may have forms for each of these types of papers. · All of these papers are simple to change. Tell your doctor what you want to change. Ask him or her to make a note in your file. Then give your family updated copies. Where can you learn more? Go to http://maik-arian.info/. Enter Q649 in the search box to learn more about \"Advance Care Planning for Heart Failure: Care Instructions. \"  Current as of: July 22, 2018  Content Version: 11.9  © 0359-7281 [employer]. Care instructions adapted under license by [employer] (which disclaims liability or warranty for this information). If you have questions about a medical condition or this instruction, always ask your healthcare professional. [place] [address] any warranty or liability for your use of this information. Patient Education        Learning About Heart Failure  What is heart failure? Heart failure means that your heart muscle does not pump as much blood as your body needs. Failure does not mean that your heart has stopped. It means that your heart is not pumping as well as it should. Your body has an amazing ability to make up for heart failure. It may do such a good job that you don't know you have a disease. But at some point, your heart and body will no longer be able to keep up. Then fluid starts to build up in your lungs and other parts of your body. What can you expect when you have heart failure? Heart failure is a lifelong (chronic) disease. Treatment may be able to slow the disease and help you feel better. But heart failure tends to get worse over time. Despite this, there are many steps you can take to feel better and stay healthy longer.   Early on, your symptoms may not be too bad. As heart failure gets worse, symptoms typically get worse, and you may need to limit your activities. Heart failure can also get worse suddenly. If this happens, you need emergency care. Then, after treatment, your symptoms may go back to being stable (which means they stay the same) for a long time. Heart failure can lead to other health problems, such as heart rhythm problems. Over time, your treatment options may change, especially as your symptoms get worse. As heart failure gets worse, palliative care can help improve the quality of your life. You can do advance care planning to decide what kind of care you want at the end of your life. What are the symptoms? Symptoms of heart failure start to happen when your heart can't pump enough blood to the rest of your body. In the early stages of heart failure, you may:  · Feel tired easily. · Be short of breath when you exert yourself. · Feel like your heart is pounding or racing (palpitations). · Feel weak or dizzy. As heart failure gets worse, fluid starts to build up in your lungs and other parts of your body. This may cause you to:  · Feel short of breath even at rest.  · Have swelling (edema), especially in your legs, ankles, and feet. · Gain weight. This may happen over just a day or two, or more slowly. · Cough or wheeze, especially when you lie down. How is heart failure treated? · You'll probably take several medicines. · You might attend cardiac rehabilitation (rehab) to get education and support that help you make lifestyle changes and stay as healthy as possible. · You may get a heart device. A pacemaker helps your heart pump blood. An ICD can stop abnormal heart rhythms. How can you care for yourself? There are many steps you can take to feel better and stay healthy longer. These steps are an important part of treatment. They can help you stay active and enjoy life. · Take your medicine the right way.  Avoid medicines that can make your symptoms worse. · Check your weight and symptoms every day. Know what to do if your symptoms get worse. · Limit sodium. This helps keep fluid from building up. It may help you feel better. · Be active. Exercise regularly, but don't exercise too hard. · Be heart-healthy. Eat healthy foods, stay at a healthy weight, limit alcohol, and don't smoke. · Stay as healthy as possible. Avoid colds and flu, get help for depression and anxiety, and manage stress. Follow-up care is a key part of your treatment and safety. Be sure to make and go to all appointments, and call your doctor if you are having problems. It's also a good idea to know your test results and keep a list of the medicines you take. Where can you learn more? Go to http://maik-arian.info/. Enter K226 in the search box to learn more about \"Learning About Heart Failure. \"  Current as of: July 22, 2018  Content Version: 11.9  © 5026-1939 [employer]. Care instructions adapted under license by [employer] (which disclaims liability or warranty for this information). If you have questions about a medical condition or this instruction, always ask your healthcare professional. [address] any warranty or liability for your use of this information. Patient Education        Avoiding Triggers With Heart Failure: Care Instructions  Your Care Instructions    Triggers are anything that make your heart failure flare up. A flare-up is also called \"sudden heart failure\" or \"acute heart failure. \" When you have a flare-up, fluid builds up in your lungs, and you have problems breathing. You might need to go to the hospital. By watching for changes in your condition and avoiding triggers, you can prevent heart failure flare-ups. Follow-up care is a key part of your treatment and safety. Be sure to make and go to all appointments, and call your doctor if you are having problems.  It's also a good idea to know your test results and keep a list of the medicines you take. How can you care for yourself at home? Watch for changes in your weight and condition  · Weigh yourself without clothing at the same time each day. Record your weight. Call your doctor if you have sudden weight gain, such as more than 2 to 3 pounds in a day or 5 pounds in a week. (Your doctor may suggest a different range of weight gain.) A sudden weight gain may mean that your heart failure is getting worse. · Keep a daily record of your symptoms. Write down any changes in how you feel, such as new shortness of breath, cough, or problems eating. Also record if your ankles are more swollen than usual and if you feel more tired than usual. Note anything that you ate or did that could have triggered these changes. Limit sodium  Sodium causes your body to hold on to extra water. This may cause your heart failure symptoms to get worse. People get most of their sodium from processed foods. Fast food and restaurant meals also tend to be very high in sodium. · Your doctor may suggest that you limit sodium to 2,000 milligrams (mg) a day or less. That is less than 1 teaspoon of salt a day, including all the salt you eat in cooking or in packaged foods. · Read food labels on cans and food packages. They tell you how much sodium you get in one serving. Check the serving size. If you eat more than one serving, you are getting more sodium. · Be aware that sodium can come in forms other than salt, including monosodium glutamate (MSG), sodium citrate, and sodium bicarbonate (baking soda). MSG is often added to Asian food. You can sometimes ask for food without MSG or salt. · Slowly reducing salt will help you adjust to the taste. Take the salt shaker off the table. · Flavor your food with garlic, lemon juice, onion, vinegar, herbs, and spices instead of salt.  Do not use soy sauce, steak sauce, onion salt, garlic salt, mustard, or ketchup on your food, unless it is labeled \"low-sodium\" or \"low-salt. \"  · Make your own salad dressings, sauces, and ketchup without adding salt. · Use fresh or frozen ingredients, instead of canned ones, whenever you can. Choose low-sodium canned goods. · Eat less processed food and food from restaurants, including fast food. Exercise as directed  Moderate, regular exercise is very good for your heart. It improves your blood flow and helps control your weight. But too much exercise can stress your heart and cause a heart failure flare-up. · Check with your doctor before you start an exercise program.  · Walking is an easy way to get exercise. Start out slowly. Gradually increase the length and pace of your walk. Swimming, riding a bike, and using a treadmill are also good forms of exercise. · When you exercise, watch for signs that your heart is working too hard. You are pushing yourself too hard if you cannot talk while you are exercising. If you become short of breath or dizzy or have chest pain, stop, sit down, and rest.  · Do not exercise when you do not feel well. Take medicines correctly  · Take your medicines exactly as prescribed. Call your doctor if you think you are having a problem with your medicine. · Make a list of all the medicines you take. Include those prescribed to you by other doctors and any over-the-counter medicines, vitamins, or supplements you take. Take this list with you when you go to any doctor. · Take your medicines at the same time every day. It may help you to post a list of all the medicines you take every day and what time of day you take them. · Make taking your medicine as simple as you can. Plan times to take your medicines when you are doing other things, such as eating a meal or getting ready for bed. This will make it easier to remember to take your medicines. · Get organized. Use helpful tools, such as daily or weekly pill containers. When should you call for help?   Call 911 if you have symptoms of sudden heart failure such as:    · You have severe trouble breathing.     · You cough up pink, foamy mucus.     · You have a new irregular or rapid heartbeat.    Call your doctor now or seek immediate medical care if:    · You have new or increased shortness of breath.     · You are dizzy or lightheaded, or you feel like you may faint.     · You have sudden weight gain, such as more than 2 to 3 pounds in a day or 5 pounds in a week. (Your doctor may suggest a different range of weight gain.)     · You have increased swelling in your legs, ankles, or feet.     · You are suddenly so tired or weak that you cannot do your usual activities.    Watch closely for changes in your health, and be sure to contact your doctor if you develop new symptoms. Where can you learn more? Go to http://maik-arian.info/. Enter V888 in the search box to learn more about \"Avoiding Triggers With Heart Failure: Care Instructions. \"  Current as of: July 22, 2018  Content Version: 11.9  © 1944-1156 [employer]. Care instructions adapted under license by [employer] (which disclaims liability or warranty for this information). If you have questions about a medical condition or this instruction, always ask your healthcare professional. [place] [address] any warranty or liability for your use of this information. Patient Education        Limiting Sodium and Fluids With Heart Failure: Care Instructions  Your Care Instructions    Sodium causes your body to hold on to extra water. This may cause your heart failure symptoms to get worse. Limiting sodium may help you feel better and lower your risk of having to go to the hospital.  People get most of their sodium from processed foods. Fast food and restaurant meals also tend to be very high in sodium. Your doctor may suggest that you limit sodium to 2,000 milligrams (mg) a day or less.  That is less than 1 teaspoon of salt a day, including all the salt you eat in cooked or packaged foods. Usually, you have to limit the amount of liquids you drink only if your heart failure is severe. Limiting sodium alone often is enough to help your body get rid of extra fluids. However, your doctor may tell you to limit your fluid intake to a set amount each day. Follow-up care is a key part of your treatment and safety. Be sure to make and go to all appointments, and call your doctor if you are having problems. It's also a good idea to know your test results and keep a list of the medicines you take. How can you care for yourself at home? Read food labels  · Read food labels on cans and food packages. The labels tell you how much sodium is in each serving. Make sure that you look at the serving size. If you eat more than the serving size, you have eaten more sodium than is listed for one serving. · Food labels also tell you the Percent Daily Value. If the Percent Daily Value says 50%, it means that you will get at least 50% of all the sodium you need for the entire day in one serving. Choose products with low Percent Daily Values for sodium. · Be aware that sodium can come in forms other than salt, including monosodium glutamate (MSG), sodium citrate, and sodium bicarbonate (baking soda). MSG is often added to Asian food. You can sometimes ask for food without MSG or salt. Buy low-sodium foods  · Buy foods that are labeled \"unsalted\" (no salt added), \"sodium-free\" (less than 5 mg of sodium per serving), or \"low-sodium\" (less than 140 mg of sodium per serving). A food labeled \"light sodium\" has less than half of the full-sodium version of that food. Foods labeled \"reduced-sodium\" may still have too much sodium. · Buy fresh vegetables or plain, frozen vegetables. Buy low-sodium versions of canned vegetables, soups, and other canned goods. Prepare low-sodium meals  · Use less salt each day when cooking.  Reducing salt in this way will help you adjust to the taste. Do not add salt after cooking. Take the salt shaker off the table. · Flavor your food with garlic, lemon juice, onion, vinegar, herbs, and spices instead of salt. Do not use soy sauce, steak sauce, onion salt, garlic salt, mustard, or ketchup on your food. · Make your own salad dressings, sauces, and ketchup without adding salt. · Use less salt (or none) when recipes call for it. You can often use half the salt a recipe calls for without losing flavor. Other dishes like rice, pasta, and grains do not need added salt. · Rinse canned vegetables. This removes some--but not all--of the salt. · Avoid water that has a naturally high sodium content or that has been treated with water softeners, which add sodium. Call your local water company to find out the sodium content of your water supply. If you buy bottled water, read the label and choose a sodium-free brand. Avoid high-sodium foods, such as:  · Smoked, cured, salted, and canned meat, fish, and poultry. · Ham, levi, hot dogs, and luncheon meats. · Regular, hard, and processed cheese and regular peanut butter. · Crackers with salted tops. · Frozen prepared meals. · Canned and dried soups, broths, and bouillon, unless labeled sodium-free or low-sodium. · Canned vegetables, unless labeled sodium-free or low-sodium. · Salted snack foods such as chips and pretzels. · [place] fries, pizza, tacos, and other fast foods. · Pickles, olives, ketchup, and other condiments, especially soy sauce, unless labeled sodium-free or low-sodium. If you cannot cook for yourself  · Have family members or friends help you, or have someone cook low-sodium meals. · Check with your local senior nutrition program to find out where meals are served and whether they offer a low-sodium option. You can often find these programs through your local health department or hospital.  · Have meals delivered to your home.  Most [facility] have a inter[employer] on [address]. These programs provide one hot meal a day for older adults, delivered to their homes. Ask whether these meals are low-sodium. Let them know that you are on a low-sodium diet. Limiting fluid intake  · Find a method that works for you. You might simply write down how much you drink every time you do. Some people keep a container filled with the amount of fluid allowed for that day. If they drink from a source other than the container, then they pour out that amount. · Measure your regular drinking glasses to find out how much fluid each one holds. Once you know this, you will not have to measure every time. · Besides water, milk, juices, and other drinks, some foods have a lot of fluid. Count any foods that will melt (such as ice cream or gelatin dessert) or liquid foods (such as soup) as part of your fluid intake for the day. Where can you learn more? Go to http://maik-arian.info/. Enter A166 in the search box to learn more about \"Limiting Sodium and Fluids With Heart Failure: Care Instructions. \"  Current as of: July 22, 2018  Content Version: 11.9  © 5025-6268 [employer]. Care instructions adapted under license by [employer] (which disclaims liability or warranty for this information). If you have questions about a medical condition or this instruction, always ask your healthcare professional. [place] [address] any warranty or liability for your use of this information. Patient Education        Medicines for Heart Failure: Care Instructions  Your Care Instructions    Most people with heart failure are helped by taking several medicines to protect their heart. These medicines can help you feel better and live longer. It is important to take all your medicines exactly as prescribed to get the best results. They can also cause side effects.   If you think that any of your medicines are causing side effects, talk with your doctor. Follow-up care is a key part of your treatment and safety. Be sure to make and go to all appointments. Call your doctor if you are having problems. It's also a good idea to know your test results and keep a list of the medicines you take. What medicines are used for heart failure? · Angiotensin-converting enzyme (ACE) inhibitors help blood flow. They relax the blood vessels and lower your blood pressure. The heart can then pump more blood through your body without working harder. These include lisinopril and captopril. · Angiotensin II receptor blockers (ARBs) work like ACE inhibitors. Some people use them instead. They include losartan. · Angiotensin receptor neprilysin inhibitor (ARNI) medicine works like ARBs and ACE inhibitors. Some people take an ARNI medicine instead. An example is sacubitril/valsartan. · Diuretics reduce swelling. They do this by helping the kidneys get rid of excess fluid. They are also called water pills. · Beta-blockers can slow the heart rate and decrease blood pressure. They can help heart failure. They include bisoprolol, carvedilol, and metoprolol. · Digoxin relieves symptoms in some people with heart failure. · Aspirin and other blood thinners prevent blood clots. Blood clots can cause a stroke or heart attack. If your doctor prescribes other medicines for heart failure, you will get more details on those specific medicines. What should you know about these medicines? ACE inhibitors  Before you start to take an ACE inhibitor, talk to your doctor. Tell him or her if:  · You take any anti-inflammatory medicines. These include ibuprofen (Advil, Motrin) and naproxen (Aleve). · You take antacids, potassium pills or a salt substitute, or lithium. · You take a diuretic. · You are pregnant or breastfeeding or you plan to get pregnant. · You have kidney disease. Side effects include:  · A dry cough.  If the cough is bad enough to make you stop the medicine, talk to your doctor. You may need to take a different one. · Feeling lightheaded. This may happen when you stand up too fast. It usually gets better with time. Angiotensin II receptor blockers (ARBs)  Before you start to take an ARB, talk to your doctor. Tell him or her if:  · You take any anti-inflammatory medicines. These include ibuprofen (Advil, Motrin) and naproxen (Aleve). · You take antacids, potassium pills or a salt substitute, or lithium. · You have kidney disease. · You take a diuretic. · You are pregnant or breastfeeding or you plan to get pregnant. Side effects include:  · Feeling lightheaded or dizzy. These are the most common side effects. Angiotensin receptor neprilysin inhibitor (ARNI)  Before you start to take an ARNI, talk to your doctor. Tell him or her if:  · You take any anti-inflammatory medicines. These include ibuprofen (Advil, Motrin) and naproxen (Aleve). · You take antacids, potassium pills or a salt substitute, or lithium. · You take an ACE inhibitor or an ARB. · You have kidney or liver problems. · You take a diuretic. · You are pregnant or breastfeeding or you plan to get pregnant. Side effects include:  · Feeling lightheaded or dizzy. These are the most common side effects. Diuretics (water pills)  Before you start to take a diuretic, talk to your doctor. Tell him or her if:  · You take lithium or anti-inflammatory medicines such as Advil or Aleve. Side effects include:  · Urinating often. Ask your doctor about timing these pills so that you do not have to use the bathroom at a bad time. · Muscle cramps. This may mean that you are losing too much potassium. It is an important mineral. Call your doctor if you get muscle cramps. · Tender breasts. This may occur in men who take spironolactone. Call your doctor if you get tender breasts that bother you. Beta-blockers  Before you start to take a beta-blocker, talk to your doctor.  Tell him or her if:  · You have asthma or diabetes. Side effects include:  · Feeling dizzy or tired. This usually gets better with time. Digoxin  Before you start to take digoxin, talk to your doctor. Tell him or her if:  · You have kidney disease. · You take a diuretic or other medicines. This includes over-the-counter medicines. Side effects include:  · Nausea or vomiting. · Confusion, changes in vision, a racing or slowed heartbeat, or dizziness. Call your doctor right away if you have any of these side effects. Aspirin and other blood thinners  Before you start to take aspirin or other blood thinners, talk to your doctor. Tell him or her if:  · You have had a stroke or stomach ulcers in the past.  · You have high blood pressure. And tell your doctor if you already take blood-thinning medicines or anti-inflammatory medicines such as Advil or Aleve. Side effects include:  · Upset stomach. Aspirin may irritate the stomach lining. If the medicine upsets your stomach, you can try taking it with food. But if that doesn't help, talk to your doctor to make sure it's not a more serious problem. · Bleeding. Call your doctor if you have unusual bleeding. This includes having nosebleeds, large or many bruises, blood in your urine, or red or black stools. Where can you learn more? Go to http://maik-arian.info/. Enter F132 in the search box to learn more about \"Medicines for Heart Failure: Care Instructions. \"  Current as of: July 22, 2018  Content Version: 11.9  © 3280-5445 [employer]. Care instructions adapted under license by [employer] (which disclaims liability or warranty for this information). If you have questions about a medical condition or this instruction, always ask your healthcare professional. [place] [address] any warranty or liability for your use of this information.          Patient Education        Managing Other Conditions When You Have Heart Failure: Care Instructions  Your Care Instructions    All the systems in your body rely on each other to work properly. Heart failure has effects all through your body that can lead to other problems, such as kidney disease. The reverse is also true. A condition like diabetes or lung disease can damage or stress your heart and cause heart failure. Managing any other problems can help reduce your heart's workload and make your heart failure better. Conditions that commonly cause or occur along with heart failure include high blood pressure, diabetes, COPD, high cholesterol, kidney problems, anemia, and arthritis. Follow-up care is a key part of your treatment and safety. Be sure to make and go to all appointments, and call your doctor if you are having problems. It's also a good idea to know your test results and keep a list of the medicines you take. How can you care for yourself at home? Steps to help with heart failure and other problems  · Eat less salt (sodium). This helps keep fluid from building up. It may help you feel better. Limiting sodium can also help if you have high blood pressure or kidney disease. · Watch your fluid intake if your doctor tells you to. Reducing fluids can ease your heart's workload and keep your sodium level in balance. · Get regular exercise. Regular, moderate exercise, such as walking, helps your heart. It can also help lower your blood pressure, lower stress, and help you lose weight. · Lose weight if you are overweight. Losing weight can help you manage diabetes, lower your blood pressure and cholesterol level, and reduce the workload on your heart. · Stop smoking. Smoking stresses your lungs, interferes with healing, and can make heart failure worse. · Limit alcohol. Alcohol can raise your blood pressure. Ask your doctor how much, if any, is safe. If your doctor has not set you up with a cardiac rehabilitation (rehab) program, talk to him or her about whether that is right for you.  Cardiac rehab includes exercise, help with diet and lifestyle changes, and emotional support. To stay as healthy as possible  · Work closely with your doctor. Have all your tests, and go to all your appointments. · Take your medicines exactly as prescribed. You will take medicines to treat the other conditions you have along with heart failure. It can be hard to balance the treatment for all your conditions. You will need to have follow-up tests to make sure that all your medicines are working well together. Talk to your doctor if you have any problems with your medicine. · Keep all your doctors informed about your health problems and all the medicines you take for them. Medicines that can treat one condition may make another condition worse. · Talk to your doctor before you take any vitamins, over-the-counter drugs, or herbal products. Do not take aspirin, ibuprofen (Advil, Motrin), or naproxen (Aleve) unless you talk to your doctor first. They could make your heart failure and other problems worse. When should you call for help? Call 911 if you have symptoms of sudden heart failure, such as:    · You have severe trouble breathing.     · You cough up pink, foamy mucus.     · You have a new irregular or rapid heartbeat.    Call 911 if you have symptoms of a heart attack. These may include:    · Chest pain or pressure, or a strange feeling in the chest.     · Sweating.     · Shortness of breath.     · Nausea or vomiting.     · Pain, pressure, or a strange feeling in the back, neck, jaw, or upper belly or in one or both shoulders or arms.     · Lightheadedness or sudden weakness.     · A fast or irregular heartbeat.    After you call 911, the  may tell you to chew 1 adult-strength or 2 to 4 low-dose aspirin. Wait for an ambulance.  Do not try to drive yourself.   Call your doctor now or seek immediate medical care if:    · You have new or increased shortness of breath.     · You are dizzy or lightheaded, or you feel like you may faint.     · You have sudden weight gain, such as more than 2 to 3 pounds in a day or 5 pounds in a week. (Your doctor may suggest a different range of weight gain.)     · You have increased swelling in your legs, ankles, or feet.     · You are suddenly so tired or weak that you cannot do your usual activities.    Watch closely for changes in your health, and be sure to contact your doctor if you develop new symptoms. Where can you learn more? Go to http://maik-arian.info/. Enter P052 in the search box to learn more about \"Managing Other Conditions When You Have Heart Failure: Care Instructions. \"  Current as of: July 22, 2018  Content Version: 11.9  © 6316-9346 [employer]. Care instructions adapted under license by [employer] (which disclaims liability or warranty for this information). If you have questions about a medical condition or this instruction, always ask your healthcare professional. [place] [address] any warranty or liability for your use of this information. Patient Education        Medicines to Avoid With Heart Failure: Care Instructions  Your Care Instructions    Your doctor gave you medicines to help treat your heart failure. But did you know that many other medicines can make heart failure worse? Even medicines and herbs that you buy over the counter (OTC) can harm you. Be sure your doctor knows all of the OTC and prescription drugs you take. And don't start to take any medicine unless your doctor says it's okay. Follow-up care is a key part of your treatment and safety. Be sure to make and go to all appointments, and call your doctor if you are having problems. It's also a good idea to know your test results and keep a list of the medicines you take. How can you care for yourself at home? Over-the-counter drugs  · Before you take any over-the-counter drug, ask your doctor or pharmacist if it is safe.  This includes herbs and vitamins. Medicines to avoid include:  ? Pain relievers called NSAIDs. These include ibuprofen and naproxen. Use acetaminophen instead. For example, you can take Tylenol for pain or fever. ? Low-dose aspirin. If your doctor has told you to take aspirin every day for your heart, follow his or her instructions on how much to take. Do not take aspirin for pain. ? Antacids or laxatives. Do not take ones that have sodium in them. These include [place]. ? Cold, cough, flu, or sinus medicines. Read the label. Do not take ones that have pseudoephedrine, ephedrine, phenylephrine, or oxymetazoline in them. And make sure they don't have aspirin or ibuprofen in them. Watch for all of these in allergy medicines, nose sprays, and herbal products too.  ? Supplements and vitamins. These include black cohosh, Alvo's wort, and vitamin E.  Prescription drugs  · Each time you see a doctor, make sure he or she knows that you take drugs for heart failure. Before you fill any new prescription, ask the pharmacist if it's okay to take the new drug. Drugs that can make heart failure worse include:  ? Calcium channel blockers. These include nifedipine. If you need to take this type of drug for another health problem, your doctor will closely watch your health. ? Heart rhythm drugs. These include disopyramide and flecainide. These can treat a fast or uneven heart rhythm. ? Prescription NSAIDs. These include celecoxib (Celebrex) and diclofenac.  ? Certain medicines for diabetes. These include pioglitazone and rosiglitazone. Where can you learn more? Go to http://maik-arian.info/. Enter R632 in the search box to learn more about \"Medicines to Avoid With Heart Failure: Care Instructions. \"  Current as of: July 22, 2018  Content Version: 11.9  © 6811-2300 [employer].  Care instructions adapted under license by [employer] (which disclaims liability or warranty for this information). If you have questions about a medical condition or this instruction, always ask your healthcare professional. [address] any warranty or liability for your use of this information. Patient Education        Learning About Self-Care for Heart Failure  What is self-care for heart failure? Heart failure usually gets worse over time. But there are many things you can do to feel better, stay healthy longer, and avoid the hospital.  Self-care means managing your health by doing certain things every day, like weighing yourself. It's about knowing which symptoms to watch for so you can avoid getting worse. When you practice good self-care, you know when it's time to call your doctor and when your heart failure has turned into an emergency. The lists below can help. Top 5 self-care tips for every day  1. Take your medicines as prescribed. This gives them the best chance of helping you. 2. Watch for signs that you're getting worse. Weighing yourself every day is one of the best ways to do this. Weight gain may be a sign that your body is holding on to too much fluid. Weigh yourself at the same time each day, using the same scale, on a hard, flat surface. The best time is in the morning after you go to the bathroom and before you eat or drink anything. 3. Find out what your triggers are, and learn to avoid them. Triggers are things that make your heart failure worse, often suddenly. A trigger may be eating too much salt, missing a dose of your medicine, or exercising too hard. 4. Limit sodium. This helps keep fluid from building up and may help you feel better. Your doctor may suggest that you limit sodium to 2,000 milligrams (mg) a day or less. That's less than 1 teaspoon. You can stay under this amount by limiting the salt you eat at home and by watching for \"hidden\" sodium when you eat out or shop for food. 5. Try to exercise throughout the week.  Exercise makes your heart stronger and can help you avoid symptoms. Walking is a great way to get exercise. If your doctor says it's safe, start out with some short walks, and then slowly build up to longer ones. When should you act? Try to become familiar with signs that mean your heart failure is getting worse. If you need help, talk with your doctor about making a personal plan. Here are some things to watch for as you practice your daily self-care. Call your doctor if:  · You have sudden weight gain, such as more than 2 to 3 pounds in a day or 5 pounds in a week. (Your doctor may suggest a different range of weight gain.)  · You have new or worse swelling in your feet, ankles, or legs. · Your breathing gets worse. Activities that did not make you short of breath before are hard for you now. · Your breathing when you lie down is worse than usual, or you wake up at night needing to catch your breath. Be sure to make and go to all of your follow-up appointments. And it's always a good idea to call your doctor anytime you have a sudden change in symptoms. When is it an emergency? Sometimes the symptoms get worse very quickly. This is called sudden heart failure. It causes fluid to build up in your lungs. Sudden heart failure is an emergency. If you have any of these symptoms, you need care right away. Call 911 if:  · You have severe shortness of breath. · You have an irregular or fast heartbeat. · You cough up foamy, pink mucus. What else can you do to stay healthy? There are other things you can do to take care of your body and your heart. These things will help you feel better. And they will also reduce your risk of heart attack and stroke. · Try to stay at a healthy weight. Eat a healthy diet with lots of fresh fruit, vegetables, and whole grains. · If you smoke, quit. · Limit the amount of alcohol you drink. · Keep high blood pressure and diabetes under control. If you need help, talk with your doctor.   If your doctor has not set you up with a cardiac rehabilitation (rehab) program, talk to him or her about whether that is right for you. Cardiac rehab includes exercise, help with diet and lifestyle changes, and emotional support. Also let your doctor know if:  · You're having trouble sleeping. Sleep is important to your well-being. It also helps your heart work the way it's supposed to. Your doctor can help you decide if you need treatment for sleep problems. · You're feeling sad or hopeless much of the time, or you are worried and anxious. Heart failure can be hard on your emotions. Treatment with counseling and medicine can help. And when you feel better, you're more likely to take care of yourself. Follow-up care is a key part of your treatment and safety. Be sure to make and go to all appointments, and call your doctor if you are having problems. It's also a good idea to know your test results and keep a list of the medicines you take. Where can you learn more? Go to http://maik-arian.info/. Enter G942 in the search box to learn more about \"Learning About Self-Care for Heart Failure. \"  Current as of: July 22, 2018  Content Version: 11.9  © 1600-9466 [employer]. Care instructions adapted under license by [employer] (which disclaims liability or warranty for this information). If you have questions about a medical condition or this instruction, always ask your healthcare professional. [place] [address] any warranty or liability for your use of this information.          DISCHARGE SUMMARY from Nurse    PATIENT INSTRUCTIONS:    After general anesthesia or intravenous sedation, for 24 hours or while taking prescription Narcotics:  · Limit your activities  · Do not drive and operate hazardous machinery  · Do not make important personal or business decisions  · Do  not drink alcoholic beverages  · If you have not urinated within 8 hours after discharge, please contact your surgeon on call. Report the following to your surgeon:  · Excessive pain, swelling, redness or odor of or around the surgical area  · Temperature over 100.5  · Nausea and vomiting lasting longer than 4 hours or if unable to take medications  · Any signs of decreased circulation or nerve impairment to extremity: change in color, persistent  numbness, tingling, coldness or increase pain  · Any questions    What to do at Home:  Recommended activity: Activity as tolerated,     If you experience any of the following symptoms Shortness of breath and swelling in legs, please follow up with PCP. *  Please give a list of your current medications to your Primary Care Provider. *  Please update this list whenever your medications are discontinued, doses are      changed, or new medications (including over-the-counter products) are added. *  Please carry medication information at all times in case of emergency situations. These are general instructions for a healthy lifestyle:    No smoking/ No tobacco products/ Avoid exposure to second hand smoke  Surgeon General's Warning:  Quitting smoking now greatly reduces serious risk to your health. Obesity, smoking, and sedentary lifestyle greatly increases your risk for illness    A healthy diet, regular physical exercise & weight monitoring are important for maintaining a healthy lifestyle    You may be retaining fluid if you have a history of heart failure or if you experience any of the following symptoms:  Weight gain of 3 pounds or more overnight or 5 pounds in a week, increased swelling in our hands or feet or shortness of breath while lying flat in bed. Please call your doctor as soon as you notice any of these symptoms; do not wait until your next office visit.     Recognize signs and symptoms of STROKE:    F-face looks uneven    A-arms unable to move or move unevenly    S-speech slurred or non-existent    T-time-call 911 as soon as signs and symptoms begin-DO NOT go       Back to bed or wait to see if you get better-TIME IS BRAIN. Warning Signs of HEART ATTACK     Call 911 if you have these symptoms:   Chest discomfort. Most heart attacks involve discomfort in the center of the chest that lasts more than a few minutes, or that goes away and comes back. It can feel like uncomfortable pressure, squeezing, fullness, or pain.  Discomfort in other areas of the upper body. Symptoms can include pain or discomfort in one or both arms, the back, neck, jaw, or stomach.  Shortness of breath with or without chest discomfort.  Other signs may include breaking out in a cold sweat, nausea, or lightheadedness. Don't wait more than five minutes to call 911 - MINUTES MATTER! Fast action can save your life. Calling 911 is almost always the fastest way to get lifesaving treatment. Emergency Medical Services staff can begin treatment when they arrive -- up to an hour sooner than if someone gets to the hospital by car. The discharge information has been reviewed with the patient. The patient verbalized understanding. Discharge medications reviewed with the patient and appropriate educational materials and side effects teaching were provided.   ___________________________________________________________________________________________________________________________________

## 2019-03-22 NOTE — DISCHARGE SUMMARY
Discharge Summary    Patient: Brigitte Pleitez MRN: [id number]  CSN: [id number]    YOB: 1929  Age: 80 y.o. Sex: male    DOA: 3/20/2019 LOS:  LOS: 2 days   Discharge Date:      Primary Care Provider:  Yogi Nina MD    Admission Diagnoses: CHF (congestive heart failure) ([facility] [I50.9]  CHF (congestive heart failure) ([facility] [I50.9]    Discharge Diagnoses:    Hospital Problems  Date Reviewed: 3/20/2019          Codes Class Noted POA    Venous insufficiency of both lower extremities ICD-10-CM: I87.2  ICD-9-CM: 459.81  3/21/2019 Yes        * (Principal) Acute on chronic combined systolic and diastolic congestive heart failure ([facility] ICD-10-CM: I50.43  ICD-9-CM: 428.43, 428.0  3/20/2019 Yes        Hypoglycemia ICD-10-CM: E16.2  ICD-9-CM: 251.2  3/20/2019 Yes        Atrial fibrillation ([facility] ICD-10-CM: I48.91  ICD-9-CM: 427.31  8/25/2017 Yes    Overview Signed 3/20/2019 11:50 PM by Lyn Diaz MD     Last Assessment & Plan: With good rate control. He is anticoagulated with Eliquis with no bleeding issues             Essential hypertension ICD-10-CM: I10  ICD-9-CM: 401.9  7/2/1999 Yes    Overview Signed 3/20/2019 11:50 PM by Lyn Diaz MD     Last Assessment & Plan:   Well controlled on present therapy                   Discharge Condition: stable     Discharge Medications:     Current Discharge Medication List      START taking these medications    Details   amoxicillin-clavulanate (AUGMENTIN) 500-125 mg per tablet Take 1 Tab by mouth two (2) times a day for 7 days. Qty: 14 Tab, Refills: 0      Saccharomyces boulardii (FLORASTOR) 250 mg capsule Take 2 Caps by mouth two (2) times a day for 7 days. Qty: 28 Cap, Refills: 0      lisinopril (PRINIVIL, ZESTRIL) 2.5 mg tablet Take 1 Tab by mouth daily. Qty: 30 Tab, Refills: 0         CONTINUE these medications which have NOT CHANGED    Details   aspirin 81 mg chewable tablet Take 81 mg by mouth daily.       folic acid/multivit-min/lutein (CENTRUM SILVER PO) Take  by mouth. digoxin (LANOXIN) 0.125 mg tablet Take 0.125 mg by mouth daily. apixaban (ELIQUIS) 2.5 mg tablet Take 2.5 mg by mouth two (2) times a day. metoprolol succinate (TOPROL-XL) 100 mg tablet Take 100 mg by mouth daily. fluoride, sodium, (PREVIDENT 5000 PLUS) 1.1 % crea by Dental route. simvastatin (ZOCOR) 20 mg tablet Take 20 mg by mouth nightly. torsemide (DEMADEX) 10 mg tablet Take 10 mg by mouth daily. Takes 5 mg every other day      cholecalciferol, vitamin D3, (VITAMIN D3) 2,000 unit tab Take  by mouth. Procedures : none     Consults: None      PHYSICAL EXAM   Visit Vitals  /81   Pulse (!) 108   Temp 97.3 °F (36.3 °C)   Resp 18   Ht 5' 9\" (1.753 m)   Wt 57.6 kg (126 lb 14.4 oz)   SpO2 100%   BMI 18.74 kg/m²     General: Awake, cooperative, no acute distress    HEENT: NC, Atraumatic. PERRLA, EOMI. Anicteric sclerae. Lungs:  CTA Bilaterally. No Wheezing/Rhonchi/Rales. Heart:  Irregular irregular ,  +murmur, No Rubs, No Gallops  Abdomen: Soft, Non distended, Non tender. +Bowel sounds,   Extremities: No c/c. Ulcer noted on left leg and hyperpigmentation   Psych:   Not anxious or agitated. Neurologic:  No acute neurological deficits. Admission HPI :  Faustino Méndez is a 80 y.o.  male who who has hx of CHF ef of 30/CAD Afib presents with worsening edema and weakness for last month   Has had leg swelling and had diuretics adjusted given outpatient antibiotic with reaction for leg edema/rash  Patient denies chest pain or palpitation has been less mobile so denies exertional dyspnea; patient has a big 89 th birth day party for the 24th and would like to be better and discharged prior to this ;   [facility] [facility] Course :  Since he was admitted, iv lasix was given for chf exacerbation. Electrolytes and renal function were monitoring.   His sob was back to his baseline. K was placed. Echo done with ef 16-20%. He f/u with Dr Andrade-cardiologist as out-pt. Low dose lisinopril was added. He needs continue to f/u with Dr. Kady Urban closely. His afib was controlled per digoxin and metoprolol. He also has venous deficiency with earlier cellulitis. rocephin was started, he needs to f/u with wound clinic. Will continue augmentin for it. Discharge planning discussed with patient and family-wife, agree with the plan and no questions and concerns at this point. Activity: Activity as tolerated    Diet: Cardiac Diet    Follow-up: PCM and wound clinic , Dr. Andrade-cardiology     Disposition: home with home     Minutes spent on discharge: 45 min       Labs: Results:       Chemistry Recent Labs     03/22/19 0249 03/21/19 0530 03/20/19 1955   GLU 72* 78 73*    141 141   K 4.3 3.3* 4.0    102 103   CO2 33* 34* 35*   BUN 25* 22* 25*   CREA 1.15 1.04 1.13   CA 8.6 8.4* 8.5   AGAP 6 5 3   BUCR 22* 21* 22*   AP  --  303* 325*   TP  --  6.5 7.2   ALB  --  2.7* 3.0*   GLOB  --  3.8 4.2*   AGRAT  --  0.7* 0.7*      CBC w/Diff Recent Labs     03/22/19 0249 03/21/19 0530 03/20/19 1955   WBC 4.9 4.6 5.3   RBC 4.41* 4.21* 4.21*   HGB 14.5 14.2 14.4   HCT 44.7 41.7 42.0    134* 132*   GRANS  --  68 75*   LYMPH  --  20* 14*   EOS  --  0 0      Cardiac Enzymes Recent Labs     03/21/19  1145 03/21/19 0530   CPK 80 65   CKND1 4.0 3.7      Coagulation No results for input(s): PTP, INR, APTT in the last 72 hours. No lab exists for component: INREXT, INREXT    Lipid Panel No results found for: CHOL, CHOLPOCT, CHOLX, CHLST, CHOLV, 521186, HDL, LDL, LDLC, DLDLP, 262016, VLDLC, VLDL, TGLX, TRIGL, TRIGP, TGLPOCT, CHHD, CHHDX   BNP No results for input(s): BNPP in the last 72 hours.    Liver Enzymes Recent Labs     03/21/19  0530   TP 6.5   ALB 2.7*   *   SGOT 39*      Thyroid Studies No results found for: T4, T3U, TSH, TSHEXT, TSHEXT         Significant Diagnostic Studies: Xr Chest Port    Result Date: 3/20/2019  EXAM:  AP Portable Chest X-ray 1 view INDICATION: History of CHF, evaluate for CHF COMPARISON: None _______________ FINDINGS: There are sternotomy wires from prior thoracotomy. Heart size is enlarged and mediastinal contours are within normal limits for portable radiograph. There are increased interstitial markings suggesting mild pulmonary edema. There is an elevated left hemidiaphragm. There is mild blunting of both costophrenic angle suggesting small effusions. There is right lung base atelectasis. No acute osseous findings.  ________________      IMPRESSION: Small effusions bilaterally with right lung base atelectasis and mild pulmonary edema            Amberly ELLSWORTH,Internal Medicine     CC: Maude Palacios MD

## 2019-03-22 NOTE — PROGRESS NOTES
DC Plan: Discharge home with [facility], MD follow up, family assistance Chart reviewed. PT recommending HH. Per [address] HH unable to accept pt due to staffing. Met with pt at bedside and again offered [address] [facility]. Pt chose any agency that has availability. Referral placed with [facility]. Pt states his wife will drive him home at discharge. 1 Neeraj Alexis [facility] liaison Melissa Dexter will assist with getting pt RW 
 
Care Management Interventions Mode of Transport at Discharge: Other (see comment)(family v BLS) Transition of Care Consult (CM Consult): Discharge Planning Current Support Network: Assisted Living Plan discussed with Pt/Family/Caregiver: Yes Freedom of Choice Offered: Yes Discharge Location Discharge Placement: Home with home health(V REHAB)

## 2019-03-24 ENCOUNTER — HOME CARE VISIT (OUTPATIENT)
Dept: HOME HEALTH SERVICES | Facility: HOME HEALTH | Age: 84
End: 2019-03-24

## 2019-03-26 ENCOUNTER — HOME CARE VISIT (OUTPATIENT)
Dept: HOME HEALTH SERVICES | Facility: HOME HEALTH | Age: 84
End: 2019-03-26
Payer: MEDICARE

## 2019-03-26 ENCOUNTER — HOME CARE VISIT (OUTPATIENT)
Dept: HOME HEALTH SERVICES | Facility: HOME HEALTH | Age: 84
End: 2019-03-26

## 2019-03-26 VITALS
DIASTOLIC BLOOD PRESSURE: 77 MMHG | OXYGEN SATURATION: 99 % | TEMPERATURE: 96.9 F | RESPIRATION RATE: 14 BRPM | SYSTOLIC BLOOD PRESSURE: 114 MMHG | HEART RATE: 79 BPM

## 2019-03-26 PROCEDURE — 400013 HH SOC

## 2019-03-26 PROCEDURE — G0299 HHS/HOSPICE OF RN EA 15 MIN: HCPCS

## 2019-03-26 PROCEDURE — 3331090001 HH PPS REVENUE CREDIT

## 2019-03-26 PROCEDURE — 3331090002 HH PPS REVENUE DEBIT

## 2019-03-27 ENCOUNTER — HOSPITAL ENCOUNTER (OUTPATIENT)
Dept: WOUND CARE | Age: 84
Discharge: HOME | End: 2019-03-27
Payer: MEDICARE

## 2019-03-27 ENCOUNTER — HOME CARE VISIT (OUTPATIENT)
Dept: HOME HEALTH SERVICES | Facility: HOME HEALTH | Age: 84
End: 2019-03-27
Payer: MEDICARE

## 2019-03-27 PROCEDURE — 3331090001 HH PPS REVENUE CREDIT

## 2019-03-27 PROCEDURE — 3331090002 HH PPS REVENUE DEBIT

## 2019-03-27 PROCEDURE — 29580 STRAPPING UNNA BOOT: CPT

## 2019-03-27 NOTE — WOUND CARE
03/27/19 1222 Wound Leg Left;Superior; Lower 03/27/19 Date First Assessed/Time First Assessed: 03/27/19 1219   Present on Hospital Admission: Yes  Wound Approximate Age at First Assessment (Weeks): 4 weeks  Primary Wound Type: Venous  Location: Leg  Wound Location Orientation: Left;Superior; Lower  Date o. .. Dressing Status Clean, dry, and intact Dressing Type Other (Comment) (acewrap, kerlix, abd, mepitel one) Non-staged Wound Description Partial thickness Wound Length (cm) 0.5 cm Wound Width (cm) 0.1 cm Wound Depth (cm) 0.1 cm Wound Volume (cm^3) 0 cm^3 Condition of Base Granulation Tissue Type Percent Red 100 Drainage Amount Small Drainage Color Serous Wound Odor None Natalia-wound Assessment Intact Margins Attached edges Cleansing and Cleansing Agents  Other (comment) (vashe) Dressing Changed Changed/New Dressing Type Applied Unna boot 
(marzena, mextra, mepitel one, zachary, bacitracin, proshield) Procedure Tolerated Well Wound Leg Lower;Left; Inferior Date First Assessed/Time First Assessed: 03/27/19 1220   Present on Hospital Admission: Yes  Wound Approximate Age at First Assessment (Weeks): 4 weeks  Primary Wound Type: Venous  Location: Leg  Wound Location Orientation: Lower;Left; Inferior Dressing Status Clean, dry, and intact Dressing Type Other (Comment) Wound Length (cm) 1 cm Wound Width (cm) 0.5 cm Wound Depth (cm) 0.1 cm Wound Volume (cm^3) 0.05 cm^3 Condition of Base Granulation Condition of Edges Open Assessment Intact Tissue Type Percent Red 100 Drainage Amount Small Drainage Color Serous Wound Odor None Natalia-wound Assessment Intact;Dry  
Margins Attached edges Cleansing and Cleansing Agents  Other (comment) (vashe) Dressing Changed Changed/New Dressing Type Applied Unna boot 
(marzena, mextra, mepitel one, zachary, bacitracin, proshield) Procedure Tolerated Well

## 2019-03-27 NOTE — DISCHARGE INSTRUCTIONS
Patient to return for wound care in: 7  Days    PLEASE CONTACT OFFICE AS SOON AS POSSIBLE IF UNABLE TO MAKE THIS APPOINTMENT. Inspect your wounds, looking for signs of infection which may include the following:  Increase in redness  Red \"streaks\" from wound  Increase in swelling  Fever  Unusual odor  Change in the amount of wound drainage. Should you experience any significant changes in your wound(s) or have any questions regarding your home care instructions please contact the wound center or your home health company. If after regular business hours, please call your family doctor or local emergency room. Edema Control:   Elevate legs as much as possible. Avoid standing in one position for more than 10 minutes. Avoid setting with legs down. Do not cross legs while sitting. Off-Loading:     Frequent position changes. Do not cross legs while sitting. Shift weight every 20 minutes or more when sitting for prolonged periods of time.

## 2019-03-28 ENCOUNTER — HOME CARE VISIT (OUTPATIENT)
Dept: SCHEDULING | Facility: HOME HEALTH | Age: 84
End: 2019-03-28
Payer: MEDICARE

## 2019-03-28 PROCEDURE — 3331090001 HH PPS REVENUE CREDIT

## 2019-03-28 PROCEDURE — 3331090002 HH PPS REVENUE DEBIT

## 2019-03-28 PROCEDURE — G0151 HHCP-SERV OF PT,EA 15 MIN: HCPCS

## 2019-03-29 ENCOUNTER — HOME CARE VISIT (OUTPATIENT)
Dept: SCHEDULING | Facility: HOME HEALTH | Age: 84
End: 2019-03-29
Payer: MEDICARE

## 2019-03-29 VITALS — OXYGEN SATURATION: 98 % | HEART RATE: 97 BPM

## 2019-03-29 VITALS — DIASTOLIC BLOOD PRESSURE: 60 MMHG | SYSTOLIC BLOOD PRESSURE: 102 MMHG | TEMPERATURE: 97.9 F

## 2019-03-29 VITALS — RESPIRATION RATE: 98 BRPM | SYSTOLIC BLOOD PRESSURE: 100 MMHG | HEART RATE: 87 BPM | DIASTOLIC BLOOD PRESSURE: 58 MMHG

## 2019-03-29 PROCEDURE — G0299 HHS/HOSPICE OF RN EA 15 MIN: HCPCS

## 2019-03-29 PROCEDURE — 3331090001 HH PPS REVENUE CREDIT

## 2019-03-29 PROCEDURE — 3331090002 HH PPS REVENUE DEBIT

## 2019-03-29 PROCEDURE — G0152 HHCP-SERV OF OT,EA 15 MIN: HCPCS

## 2019-03-30 PROCEDURE — 3331090002 HH PPS REVENUE DEBIT

## 2019-03-30 PROCEDURE — 3331090001 HH PPS REVENUE CREDIT

## 2019-03-31 PROCEDURE — 3331090002 HH PPS REVENUE DEBIT

## 2019-03-31 PROCEDURE — 3331090001 HH PPS REVENUE CREDIT

## 2019-04-01 PROCEDURE — 3331090001 HH PPS REVENUE CREDIT

## 2019-04-01 PROCEDURE — 3331090002 HH PPS REVENUE DEBIT

## 2019-04-02 ENCOUNTER — HOME CARE VISIT (OUTPATIENT)
Dept: SCHEDULING | Facility: HOME HEALTH | Age: 84
End: 2019-04-02
Payer: MEDICARE

## 2019-04-02 PROCEDURE — 3331090002 HH PPS REVENUE DEBIT

## 2019-04-02 PROCEDURE — 3331090001 HH PPS REVENUE CREDIT

## 2019-04-02 PROCEDURE — G0157 HHC PT ASSISTANT EA 15: HCPCS

## 2019-04-03 ENCOUNTER — HOSPITAL ENCOUNTER (OUTPATIENT)
Dept: WOUND CARE | Age: 84
Discharge: HOME | End: 2019-04-03
Payer: MEDICARE

## 2019-04-03 VITALS
HEART RATE: 61 BPM | DIASTOLIC BLOOD PRESSURE: 74 MMHG | OXYGEN SATURATION: 100 % | TEMPERATURE: 97.3 F | HEIGHT: 69 IN | WEIGHT: 130 LBS | BODY MASS INDEX: 19.26 KG/M2 | RESPIRATION RATE: 17 BRPM | SYSTOLIC BLOOD PRESSURE: 122 MMHG

## 2019-04-03 VITALS
DIASTOLIC BLOOD PRESSURE: 77 MMHG | OXYGEN SATURATION: 97 % | HEART RATE: 83 BPM | TEMPERATURE: 97.9 F | SYSTOLIC BLOOD PRESSURE: 123 MMHG

## 2019-04-03 PROCEDURE — 3331090001 HH PPS REVENUE CREDIT

## 2019-04-03 PROCEDURE — 3331090002 HH PPS REVENUE DEBIT

## 2019-04-03 PROCEDURE — 29580 STRAPPING UNNA BOOT: CPT

## 2019-04-03 NOTE — WOUND CARE
04/03/19 1026 [REMOVED] Wound Leg Left;Superior; Lower 03/27/19 Final Assessment Date/Final Assessment Time: 04/03/19 1028  Date First Assessed/Time First Assessed: 03/27/19 1219   Present on Hospital Admission: Yes  Wound Approximate Age at First Assessment (Weeks): 4 weeks  Primary Wound Type: Venous  Location: . .. Dressing Status Clean, dry, and intact Wound Length (cm) 0 cm Wound Width (cm) 0 cm Wound Depth (cm) 0 cm Wound Volume (cm^3) 0 cm^3 Wound Leg Lower;Left; Inferior Date First Assessed/Time First Assessed: 03/27/19 1220   Present on Hospital Admission: Yes  Wound Approximate Age at First Assessment (Weeks): 4 weeks  Primary Wound Type: Venous  Location: Leg  Wound Location Orientation: Lower;Left; Inferior Dressing Status Clean, dry, and intact Dressing Type Unna boot Wound Length (cm) 0.8 cm Wound Width (cm) 0.4 cm Wound Depth (cm) 0.1 cm Wound Volume (cm^3) 0.03 cm^3 Condition of Base Granulation Condition of Edges Open Assessment Intact Tissue Type Percent Red 100 Drainage Amount Scant Drainage Color Serous Wound Odor None Natalia-wound Assessment Intact Margins Attached edges Cleansing and Cleansing Agents  Other (comment) (vashe) Dressing Changed Changed/New Dressing Type Applied Unna boot 
(marzena, Cogniaell ag, zachary) Procedure Tolerated Well

## 2019-04-04 ENCOUNTER — HOME CARE VISIT (OUTPATIENT)
Dept: SCHEDULING | Facility: HOME HEALTH | Age: 84
End: 2019-04-04
Payer: MEDICARE

## 2019-04-04 PROCEDURE — G0157 HHC PT ASSISTANT EA 15: HCPCS

## 2019-04-04 PROCEDURE — 3331090001 HH PPS REVENUE CREDIT

## 2019-04-04 PROCEDURE — 3331090002 HH PPS REVENUE DEBIT

## 2019-04-04 PROCEDURE — G0158 HHC OT ASSISTANT EA 15: HCPCS

## 2019-04-05 ENCOUNTER — HOME CARE VISIT (OUTPATIENT)
Dept: SCHEDULING | Facility: HOME HEALTH | Age: 84
End: 2019-04-05
Payer: MEDICARE

## 2019-04-05 VITALS
HEART RATE: 75 BPM | TEMPERATURE: 97.9 F | DIASTOLIC BLOOD PRESSURE: 73 MMHG | OXYGEN SATURATION: 94 % | SYSTOLIC BLOOD PRESSURE: 115 MMHG

## 2019-04-05 VITALS — SYSTOLIC BLOOD PRESSURE: 95 MMHG | DIASTOLIC BLOOD PRESSURE: 68 MMHG | OXYGEN SATURATION: 98 % | HEART RATE: 85 BPM

## 2019-04-05 PROCEDURE — G0299 HHS/HOSPICE OF RN EA 15 MIN: HCPCS

## 2019-04-05 PROCEDURE — 3331090002 HH PPS REVENUE DEBIT

## 2019-04-05 PROCEDURE — 3331090001 HH PPS REVENUE CREDIT

## 2019-04-06 ENCOUNTER — HOME CARE VISIT (OUTPATIENT)
Dept: SCHEDULING | Facility: HOME HEALTH | Age: 84
End: 2019-04-06
Payer: MEDICARE

## 2019-04-06 VITALS
TEMPERATURE: 98.4 F | DIASTOLIC BLOOD PRESSURE: 66 MMHG | OXYGEN SATURATION: 95 % | RESPIRATION RATE: 14 BRPM | BODY MASS INDEX: 19.05 KG/M2 | SYSTOLIC BLOOD PRESSURE: 101 MMHG | HEART RATE: 76 BPM | WEIGHT: 129 LBS

## 2019-04-06 VITALS — SYSTOLIC BLOOD PRESSURE: 89 MMHG | DIASTOLIC BLOOD PRESSURE: 62 MMHG

## 2019-04-06 PROCEDURE — 3331090001 HH PPS REVENUE CREDIT

## 2019-04-06 PROCEDURE — G0158 HHC OT ASSISTANT EA 15: HCPCS

## 2019-04-06 PROCEDURE — 3331090002 HH PPS REVENUE DEBIT

## 2019-04-07 PROCEDURE — 3331090002 HH PPS REVENUE DEBIT

## 2019-04-07 PROCEDURE — 3331090001 HH PPS REVENUE CREDIT

## 2019-04-08 ENCOUNTER — HOME CARE VISIT (OUTPATIENT)
Dept: SCHEDULING | Facility: HOME HEALTH | Age: 84
End: 2019-04-08
Payer: MEDICARE

## 2019-04-08 PROCEDURE — 3331090002 HH PPS REVENUE DEBIT

## 2019-04-08 PROCEDURE — 3331090001 HH PPS REVENUE CREDIT

## 2019-04-08 PROCEDURE — G0157 HHC PT ASSISTANT EA 15: HCPCS

## 2019-04-08 PROCEDURE — G0152 HHCP-SERV OF OT,EA 15 MIN: HCPCS

## 2019-04-09 ENCOUNTER — HOME CARE VISIT (OUTPATIENT)
Dept: SCHEDULING | Facility: HOME HEALTH | Age: 84
End: 2019-04-09
Payer: MEDICARE

## 2019-04-09 VITALS
SYSTOLIC BLOOD PRESSURE: 111 MMHG | TEMPERATURE: 98.3 F | DIASTOLIC BLOOD PRESSURE: 84 MMHG | HEART RATE: 89 BPM | OXYGEN SATURATION: 93 %

## 2019-04-09 VITALS — SYSTOLIC BLOOD PRESSURE: 110 MMHG | OXYGEN SATURATION: 98 % | HEART RATE: 73 BPM | DIASTOLIC BLOOD PRESSURE: 65 MMHG

## 2019-04-09 VITALS
OXYGEN SATURATION: 98 % | HEART RATE: 69 BPM | DIASTOLIC BLOOD PRESSURE: 62 MMHG | RESPIRATION RATE: 16 BRPM | TEMPERATURE: 98.8 F | SYSTOLIC BLOOD PRESSURE: 110 MMHG

## 2019-04-09 PROCEDURE — 3331090001 HH PPS REVENUE CREDIT

## 2019-04-09 PROCEDURE — 3331090002 HH PPS REVENUE DEBIT

## 2019-04-09 PROCEDURE — G0299 HHS/HOSPICE OF RN EA 15 MIN: HCPCS

## 2019-04-10 ENCOUNTER — HOME CARE VISIT (OUTPATIENT)
Dept: SCHEDULING | Facility: HOME HEALTH | Age: 84
End: 2019-04-10
Payer: MEDICARE

## 2019-04-10 PROCEDURE — 3331090002 HH PPS REVENUE DEBIT

## 2019-04-10 PROCEDURE — G0157 HHC PT ASSISTANT EA 15: HCPCS

## 2019-04-10 PROCEDURE — 3331090001 HH PPS REVENUE CREDIT

## 2019-04-11 VITALS
HEART RATE: 61 BPM | DIASTOLIC BLOOD PRESSURE: 83 MMHG | SYSTOLIC BLOOD PRESSURE: 119 MMHG | TEMPERATURE: 97.7 F | OXYGEN SATURATION: 95 %

## 2019-04-11 PROCEDURE — 3331090002 HH PPS REVENUE DEBIT

## 2019-04-11 PROCEDURE — 3331090001 HH PPS REVENUE CREDIT

## 2019-04-12 ENCOUNTER — HOSPITAL ENCOUNTER (OUTPATIENT)
Dept: WOUND CARE | Age: 84
Discharge: HOME | End: 2019-04-12
Payer: MEDICARE

## 2019-04-12 VITALS
RESPIRATION RATE: 18 BRPM | SYSTOLIC BLOOD PRESSURE: 110 MMHG | DIASTOLIC BLOOD PRESSURE: 67 MMHG | HEART RATE: 86 BPM | TEMPERATURE: 98.1 F | OXYGEN SATURATION: 98 %

## 2019-04-12 PROCEDURE — 3331090002 HH PPS REVENUE DEBIT

## 2019-04-12 PROCEDURE — 3331090001 HH PPS REVENUE CREDIT

## 2019-04-12 PROCEDURE — 29580 STRAPPING UNNA BOOT: CPT

## 2019-04-12 NOTE — WOUND CARE
04/12/19 1055 Wound Leg Lower;Left; Inferior Date First Assessed/Time First Assessed: 03/27/19 1220   Present on Hospital Admission: Yes  Wound Approximate Age at First Assessment (Weeks): 4 weeks  Primary Wound Type: Venous  Location: Leg  Wound Location Orientation: Lower;Left; Inferior Dressing Status Clean, dry, and intact Dressing Type Unna boot Wound Length (cm) 0.1 cm Wound Width (cm) 0.1 cm Wound Depth (cm) 0.1 cm Wound Volume (cm^3) 0 cm^3 Condition of Base Granulation Condition of Edges Open Epithelialization (%) 99 Assessment Pink Drainage Amount Scant Drainage Color Serosanguinous Wound Odor None Natalia-wound Assessment Intact Cleansing and Cleansing Agents  Other (comment); Soap and water (vashe) Dressing Changed Changed/New Dressing Type Applied Other (Comment) 
(fuentes Ocasio) Procedure Tolerated Well

## 2019-04-12 NOTE — DISCHARGE INSTRUCTIONS
For Home Care/Self Care  Keep dressing dry and intact when bathing    Leave dressings in place until next visit    Patient to return for wound care in: 7  Days- per Dr. Andre Moncada, okay to discharge if wound is closed    PLEASE CONTACT OFFICE AS SOON AS POSSIBLE IF UNABLE TO [address]. Inspect your wounds, looking for signs of infection which may include the following:  Increase in redness  Red \"streaks\" from wound  Increase in swelling  Fever  Unusual odor  Change in the amount of wound drainage. Should you experience any significant changes in your wound(s) or have any questions regarding your home care instructions please contact the wound center or your home health company. If after regular business hours, please call your family doctor or local emergency room. Edema Control:   Elevate legs as much as possible. Avoid standing in one position for more than 10 minutes. Avoid setting with legs down. Do not cross legs while sitting. Off-Loading:     Frequent position changes. Do not cross legs while sitting. Shift weight every 20 minutes or more when sitting for prolonged periods of time.

## 2019-04-13 PROCEDURE — 3331090002 HH PPS REVENUE DEBIT

## 2019-04-13 PROCEDURE — 3331090001 HH PPS REVENUE CREDIT

## 2019-04-14 PROCEDURE — 3331090001 HH PPS REVENUE CREDIT

## 2019-04-14 PROCEDURE — 3331090002 HH PPS REVENUE DEBIT

## 2019-04-15 ENCOUNTER — HOME CARE VISIT (OUTPATIENT)
Dept: SCHEDULING | Facility: HOME HEALTH | Age: 84
End: 2019-04-15
Payer: MEDICARE

## 2019-04-15 PROCEDURE — G0157 HHC PT ASSISTANT EA 15: HCPCS

## 2019-04-15 PROCEDURE — 3331090002 HH PPS REVENUE DEBIT

## 2019-04-15 PROCEDURE — 3331090001 HH PPS REVENUE CREDIT

## 2019-04-16 ENCOUNTER — HOME CARE VISIT (OUTPATIENT)
Dept: SCHEDULING | Facility: HOME HEALTH | Age: 84
End: 2019-04-16
Payer: MEDICARE

## 2019-04-16 VITALS
DIASTOLIC BLOOD PRESSURE: 71 MMHG | SYSTOLIC BLOOD PRESSURE: 107 MMHG | HEART RATE: 79 BPM | OXYGEN SATURATION: 96 % | TEMPERATURE: 98 F

## 2019-04-16 VITALS
DIASTOLIC BLOOD PRESSURE: 62 MMHG | SYSTOLIC BLOOD PRESSURE: 100 MMHG | TEMPERATURE: 97.8 F | OXYGEN SATURATION: 96 % | HEART RATE: 62 BPM | RESPIRATION RATE: 16 BRPM

## 2019-04-16 PROCEDURE — G0299 HHS/HOSPICE OF RN EA 15 MIN: HCPCS

## 2019-04-16 PROCEDURE — 3331090001 HH PPS REVENUE CREDIT

## 2019-04-16 PROCEDURE — 3331090002 HH PPS REVENUE DEBIT

## 2019-04-17 ENCOUNTER — HOME CARE VISIT (OUTPATIENT)
Dept: SCHEDULING | Facility: HOME HEALTH | Age: 84
End: 2019-04-17
Payer: MEDICARE

## 2019-04-17 ENCOUNTER — HOSPITAL ENCOUNTER (OUTPATIENT)
Dept: WOUND CARE | Age: 84
Discharge: HOME | End: 2019-04-17
Payer: MEDICARE

## 2019-04-17 VITALS
DIASTOLIC BLOOD PRESSURE: 80 MMHG | HEART RATE: 87 BPM | SYSTOLIC BLOOD PRESSURE: 98 MMHG | TEMPERATURE: 97.6 F | RESPIRATION RATE: 18 BRPM

## 2019-04-17 PROCEDURE — 99211 OFF/OP EST MAY X REQ PHY/QHP: CPT

## 2019-04-17 PROCEDURE — G0157 HHC PT ASSISTANT EA 15: HCPCS

## 2019-04-17 PROCEDURE — 3331090001 HH PPS REVENUE CREDIT

## 2019-04-17 PROCEDURE — 3331090002 HH PPS REVENUE DEBIT

## 2019-04-17 NOTE — WOUND CARE
04/17/19 1417 Wound Leg Lower;Left; Inferior Date First Assessed/Time First Assessed: 03/27/19 1220   Present on Hospital Admission: Yes  Wound Approximate Age at First Assessment (Weeks): 4 weeks  Primary Wound Type: Venous  Location: Leg  Wound Location Orientation: Lower;Left; Inferior Dressing Status Clean, dry, and intact Dressing Type Unna boot Wound Length (cm)  
(Clinically closed) Condition of Base Pink Condition of Edges Closed Assessment Pink Drainage Amount None Wound Odor None Natalia-wound Assessment Intact Cleansing and Cleansing Agents  Soap and water Dressing Changed Other (Comment) Dressing Type Applied (Tubi- applied) Procedure Tolerated Well Discussed support stockings at length with patient and wife and they verbalized understanding the importance of obtaining and wearing the stockings. They are in contact with [employer] regarding stockings.

## 2019-04-18 VITALS
HEART RATE: 71 BPM | SYSTOLIC BLOOD PRESSURE: 112 MMHG | OXYGEN SATURATION: 97 % | TEMPERATURE: 98.3 F | DIASTOLIC BLOOD PRESSURE: 77 MMHG

## 2019-04-18 PROCEDURE — 3331090001 HH PPS REVENUE CREDIT

## 2019-04-18 PROCEDURE — 3331090002 HH PPS REVENUE DEBIT

## 2019-04-19 PROCEDURE — 3331090001 HH PPS REVENUE CREDIT

## 2019-04-19 PROCEDURE — 3331090002 HH PPS REVENUE DEBIT

## 2019-04-20 PROCEDURE — 3331090001 HH PPS REVENUE CREDIT

## 2019-04-20 PROCEDURE — 3331090002 HH PPS REVENUE DEBIT

## 2019-04-21 PROCEDURE — 3331090002 HH PPS REVENUE DEBIT

## 2019-04-21 PROCEDURE — 3331090001 HH PPS REVENUE CREDIT

## 2019-04-22 ENCOUNTER — HOSPITAL ENCOUNTER (OUTPATIENT)
Dept: WOUND CARE | Age: 84
Discharge: HOME | End: 2019-04-22
Payer: MEDICARE

## 2019-04-22 VITALS
HEART RATE: 99 BPM | DIASTOLIC BLOOD PRESSURE: 75 MMHG | TEMPERATURE: 97.7 F | SYSTOLIC BLOOD PRESSURE: 107 MMHG | OXYGEN SATURATION: 96 % | RESPIRATION RATE: 20 BRPM

## 2019-04-22 PROCEDURE — 3331090002 HH PPS REVENUE DEBIT

## 2019-04-22 PROCEDURE — 29580 STRAPPING UNNA BOOT: CPT

## 2019-04-22 PROCEDURE — 3331090001 HH PPS REVENUE CREDIT

## 2019-04-22 NOTE — WOUND CARE
04/22/19 1515 Wound Leg Lower;Left; Inferior Date First Assessed/Time First Assessed: 03/27/19 1220   Present on Hospital Admission: Yes  Wound Approximate Age at First Assessment (Weeks): 4 weeks  Primary Wound Type: Venous  Location: Leg  Wound Location Orientation: Lower;Left; Inferior Dressing Status Intact; Old drainage Dressing Type Unna boot Wound Length (cm) 1.2 cm Wound Width (cm) 1 cm Wound Depth (cm) 0.1 cm Wound Volume (cm^3) 0.12 cm^3 Condition of Base Pink Condition of Edges Open Assessment Pink;Red Drainage Amount None Wound Odor None Natalia-wound Assessment Intact Cleansing and Cleansing Agents  Soap and water Dressing Changed Other (Comment) Dressing Type Applied Other (Comment) 
(proshield  WCL, unnaboot, coban) Procedure Tolerated Well

## 2019-04-23 ENCOUNTER — HOME CARE VISIT (OUTPATIENT)
Dept: SCHEDULING | Facility: HOME HEALTH | Age: 84
End: 2019-04-23
Payer: MEDICARE

## 2019-04-23 PROCEDURE — 3331090002 HH PPS REVENUE DEBIT

## 2019-04-23 PROCEDURE — G0151 HHCP-SERV OF PT,EA 15 MIN: HCPCS

## 2019-04-23 PROCEDURE — 3331090001 HH PPS REVENUE CREDIT

## 2019-04-24 VITALS
HEART RATE: 87 BPM | OXYGEN SATURATION: 97 % | DIASTOLIC BLOOD PRESSURE: 62 MMHG | TEMPERATURE: 97 F | SYSTOLIC BLOOD PRESSURE: 110 MMHG

## 2019-04-24 PROCEDURE — 3331090002 HH PPS REVENUE DEBIT

## 2019-04-24 PROCEDURE — 3331090001 HH PPS REVENUE CREDIT

## 2019-04-25 PROCEDURE — 3331090001 HH PPS REVENUE CREDIT

## 2019-04-25 PROCEDURE — 3331090002 HH PPS REVENUE DEBIT

## 2019-04-29 ENCOUNTER — HOSPITAL ENCOUNTER (OUTPATIENT)
Dept: WOUND CARE | Age: 84
Discharge: HOME | End: 2019-04-29
Payer: MEDICARE

## 2019-04-29 VITALS
RESPIRATION RATE: 15 BRPM | SYSTOLIC BLOOD PRESSURE: 101 MMHG | HEART RATE: 65 BPM | OXYGEN SATURATION: 96 % | DIASTOLIC BLOOD PRESSURE: 56 MMHG | TEMPERATURE: 97.8 F

## 2019-04-29 PROCEDURE — 29580 STRAPPING UNNA BOOT: CPT

## 2019-05-08 ENCOUNTER — HOSPITAL ENCOUNTER (OUTPATIENT)
Dept: WOUND CARE | Age: 84
Discharge: HOME | End: 2019-05-08
Payer: MEDICARE

## 2019-05-08 VITALS
TEMPERATURE: 97.6 F | OXYGEN SATURATION: 96 % | SYSTOLIC BLOOD PRESSURE: 104 MMHG | RESPIRATION RATE: 18 BRPM | DIASTOLIC BLOOD PRESSURE: 79 MMHG | HEART RATE: 84 BPM

## 2019-05-08 PROCEDURE — 29580 STRAPPING UNNA BOOT: CPT

## 2019-05-08 NOTE — DISCHARGE INSTRUCTIONS
For Home Care/Self Care  Keep dressing dry and intact when bathing    Leave dressings in place until next visit    Patient to return for wound care in: [address] [place]. Inspect your wounds, looking for signs of infection which may include the following:  Increase in redness  Red \"streaks\" from wound  Increase in swelling  Fever  Unusual odor  Change in the amount of wound drainage. Should you experience any significant changes in your wound(s) or have any questions regarding your home care instructions please contact the wound center or your home health company. If after regular business hours, please call your family doctor or local emergency room. Edema Control:   Elevate legs as much as possible. Avoid standing in one position for more than 10 minutes. Avoid setting with legs down. Do not cross legs while sitting. Off-Loading:     Frequent position changes. Do not cross legs while sitting. Shift weight every 20 minutes or more when sitting for prolonged periods of time.

## 2019-05-08 NOTE — WOUND CARE
05/08/19 1418 Wound Leg Lower;Left; Inferior Date First Assessed/Time First Assessed: 03/27/19 1220   Present on Hospital Admission: Yes  Wound Approximate Age at First Assessment (Weeks): 4 weeks  Primary Wound Type: Venous  Location: Leg  Wound Location Orientation: Lower;Left; Inferior Dressing Status Clean, dry, and intact Dressing Type Absorptive; Unna boot Wound Length (cm) 0.1 cm Wound Width (cm) 0.1 cm Wound Depth (cm) 0.1 cm Wound Volume (cm^3) 0 cm^3 Condition of Base Pink Condition of Edges Open Assessment Pink Drainage Amount Small Drainage Color Serosanguinous Wound Odor None Natalia-wound Assessment Intact;Edema Cleansing and Cleansing Agents  Soap and water Dressing Changed Changed/New Dressing Type Applied Unna boot Procedure Tolerated Well

## 2019-05-14 LAB
ATRIAL RATE: 122 BPM
CALCULATED R AXIS, ECG10: -25 DEGREES
CALCULATED T AXIS, ECG11: 160 DEGREES
DIAGNOSIS, 93000: NORMAL
P-R INTERVAL, ECG05: 200 MS
Q-T INTERVAL, ECG07: 420 MS
QRS DURATION, ECG06: 182 MS
QTC CALCULATION (BEZET), ECG08: 547 MS
VENTRICULAR RATE, ECG03: 102 BPM

## 2019-05-15 NOTE — PROGRESS NOTES
[address] Subjective: Chief Complaint: Carmen Ramsay is a 80 y.o. male who presents to [facility] today, for treatment of open wound on the front of his left shin. He states it started in 03/2019 as a blister with leg swelling. He explains that he went to [facility], then to his primary care physician Dr. Carlitos Ferrer with no improvement; he went to [facility] ED Emergency Room and was admitted for three days where he [facility]. He states that today the wound is much improved. Past Medical History:  
Diagnosis Date  CHF (congestive heart failure) ([facility] [address]  Hypertension History reviewed. No pertinent surgical history. PSH: Aortic Valve Replaced Current Medications: 
Prior to Admission medications Medication Sig Start Date End Date Taking? Authorizing Provider  
lisinopril (PRINIVIL, ZESTRIL) 2.5 mg tablet Take 1 Tab by mouth daily. Patient not taking: Reported on 4/23/2019 3/22/19   Ricardo Andersen MD  
aspirin 81 mg chewable tablet Take 81 mg by mouth daily. Ever Almaguer MD  
folic acid/multivit-min/lutein (CENTRUM SILVER PO) Take 1 Tab by mouth daily. Ever Almaguer MD  
digoxin (LANOXIN) 0.125 mg tablet Take 0.125 mg by mouth daily. Ever Almaguer MD  
apixaban (ELIQUIS) 2.5 mg tablet Take 2.5 mg by mouth two (2) times a day. Ever Almaguer MD  
metoprolol succinate (TOPROL-XL) 100 mg tablet Take 100 mg by mouth daily. Ever Almaguer MD  
simvastatin (ZOCOR) 20 mg tablet Take 20 mg by mouth nightly. Ever Almaguer MD  
torsemide (DEMADEX) 10 mg tablet Take 10 mg by mouth daily. Takes 5 mg every other day    Ever Almaguer MD  
cholecalciferol, vitamin D3, (VITAMIN D3) 2,000 unit tab Take 2,000 Int'l Units by mouth daily. Ever Almaguer MD  
 
Allergies Allergen Reactions  Bactrim [Sulfamethoprim] Hives History reviewed. No pertinent family history. Social History Socioeconomic History  Marital status:  Spouse name: Not on file  Number of children: Not on file  Years of education: Not on file  Highest education level: Not on file Occupational History  Not on file Social Needs  Financial resource strain: Not on file  Food insecurity:  
  Worry: Not on file Inability: Not on file  Transportation needs:  
  Medical: Not on file Non-medical: Not on file Tobacco Use  Smoking status: Never Smoker  Smokeless tobacco: Never Used Substance and Sexual Activity  Alcohol use: Yes Frequency: Never Comment: socially  Drug use: Never  Sexual activity: Not on file Lifestyle  Physical activity:  
  Days per week: Not on file Minutes per session: Not on file  Stress: Not on file Relationships  Social connections:  
  Talks on phone: Not on file Gets together: Not on file Attends Yarsanism service: Not on file Active member of club or organization: Not on file Attends meetings of clubs or organizations: Not on file Relationship status: Not on file  Intimate partner violence:  
  Fear of current or ex partner: Not on file Emotionally abused: Not on file Physically abused: Not on file Forced sexual activity: Not on file Other Topics Concern  Not on file Social History Narrative  Not on file Review of Systems: 
 
Gen: No fever, chills, malaise, weight loss/gain. Heent: No headache, rhinorrhea, epistaxis, ear pain, hearing loss, sinus pain, neck pain/stiffness, sore throat. Heart: No chest pain, palpitations, WHITESIDE, pnd, or orthopnea. Resp: No cough, hemoptysis, wheezing and shortness of breath. GI: No nausea, vomiting, diarrhea, constipation, melena or hematochezia. : No urinary obstruction, dysuria or hematuria. Derm:  Open ulcer left anterior shin. Musc/skeletal: no bone or joint complains. Vasc: No edema, cyanosis or claudication. Endo: No heat/cold intolerance, no polyuria,polydipsia or polyphagia. Neuro: No unilateral weakness, numbness, tingling. No seizures. Heme: No easy bruising or bleeding. Objective:  
 
Physical Assessment: 
 
Vitals:  
 04/12/19 1054 BP: 110/67 Pulse: 86 Resp: 18 Temp: 98.1 °F (36.7 °C) SpO2: 98% Lower Extremity Exam: 
 
Vascular Exam: 
Dorsalis Pedis Pulse:0/4  Bilateral Lower Extremities Posterior Tibial Pulse: 0/4 Bilateral Lower Extremities Capillary Refill is < 3 seconds Bilateral Lower Extremities Temperature of extremity from proximal to distal is warm and perfused Bilateral Lower Extremities Recent CHARLEE results: None Integumentary Exam: 
Skin Texture is WNL Bilateral Lower Extremities Pedal Hair is present Bilateral Lower Extremities Examination of lower extremity reveals open ulcer left lateral anterior shin - 
Etiology: Started as a blister - Wound Description: Wound Type: Venous stasis Indication:   Chronic >30days Status: improving Measurements: 
  Wound Length:  0.1cm Wound Width : 0.1cm Wound Depth : 0.1cm Tissue Type: Ulcer bed: Red granular base Periwound: Surrounding erythema with intact dermal layer Exudate: serosanguinous drainage noted Epithelial: with out necrosis Granulation: with out necrosis Subcutaneous: with out necrosis Slough: no 
Eschar: no 
Tendon: exposed no -with out necrosis Fascia: exposed no -with out necrosis Muscle: exposed no -with out necrosis Bone: exposed no -with out necrosis Drainage: Decreased Debridement:  --not required Infection: no  
Edema: no  
Compression: no  
 
Offloading:  No  
 
Nicotine/Tobacco Use:  No 
 
 
Orthopedic Exam: Musculoskeletal Exam: Muscle strength is 5/5 Bilateral Lower Extremities. Tendon, Muscle and Bone and joints and WNL Bilateral Lower Extremities. Range of motion and strength noted in both ankles is WNL. Neurological Exam: Ankle deep tendon reflexes: Are WNL Bilateral Lower Extremities Epicritic and Protective Sensation: Are Intact Bilateral Lower Extremities Evaluation of lower extremity nerves: Are Intact as seen with 5.07 mm monofilament at 5 points per foot. Laboratory Results: 
@ Basic Metabolic Profile@ Lab Results Component Value Date  03/22/2019 CO2 33 (H) 03/22/2019 BUN 25 (H) 03/22/2019 Data Review: No results found for this or any previous visit (from the past 24 hour(s)). Assessment:  
 
80 y.o. male with Patient Active Problem List  
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  
 Aortic valve stenosis I35.0  Atrial fibrillation (HCC) I48.91  
 Chronic anemia D64.9  Chronic systolic congestive heart failure (HCC) I50.22  
 Essential hypertension I10  
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 Recommendation:  
 
Plan:  
 
Plan/Procedure: 
Needs : 
 
Good local wound care In wound care clinic today: 
Cleanse wound with commercial wound cleanser Apply the following topically to wound bed: Kinsey Apply the following to juan daniel-wound: NA Apply the following dressings: UNNA boot Patient to return for wound care in: 7  Days Follow up with Nurse visit as recommended. PLEASE CONTACT OFFICE AS SOON AS POSSIBLE IF UNABLE TO MAKE THIS APPOINTMENT.  Inspect your wounds, looking for signs of infection which may include the following:  Increase in redness  Red \"streaks\" from wound  Increase in swelling Fever  Unusual odor Change in the amount of wound drainage. Should you experience any significant changes in your wound(s) or have any questions regarding your home care instructions please contact the wound center or your home health company. If after regular business hours, please call your family doctor or local emergency room.  
 
 
 
 
Signed By: David Harrell DPM 
  
 May 15, 2019, 4:00 PM

## 2019-05-17 ENCOUNTER — HOSPITAL ENCOUNTER (OUTPATIENT)
Dept: WOUND CARE | Age: 84
Discharge: HOME | End: 2019-05-17
Payer: MEDICARE

## 2019-05-17 PROCEDURE — 99213 OFFICE O/P EST LOW 20 MIN: CPT

## 2019-05-17 NOTE — WOUND CARE
05/17/19 1607 Wound Leg Lower;Left; Inferior Date First Assessed/Time First Assessed: 03/27/19 1220   Present on Hospital Admission: Yes  Wound Approximate Age at First Assessment (Weeks): 4 weeks  Primary Wound Type: Venous  Location: Leg  Wound Location Orientation: Lower;Left; Inferior Dressing Status Clean, dry, and intact Dressing Type Unna boot Wound Length (cm)  
(no open areas) Dressing Type Applied (compression stocking applied) Procedure Tolerated Well

## 2019-05-22 NOTE — PROGRESS NOTES
[address] [address]    Subjective:         Chief Complaint: Cachorro Linares is a 80 y.o. male who returns to [facility] today, for treatment of open wound on the front of his left shin. He presents today with the wound all closed. Past Medical History:   Diagnosis Date    CHF (congestive heart failure) ([facility] [address]     Hypertension      No past surgical history on file. PSH: Aortic Valve Replaced  Current Medications:  Prior to Admission medications    Medication Sig Start Date End Date Taking? Authorizing Provider   lisinopril (PRINIVIL, ZESTRIL) 2.5 mg tablet Take 1 Tab by mouth daily. Patient not taking: Reported on 4/23/2019 3/22/19   Omaira Knight MD   aspirin 81 mg chewable tablet Take 81 mg by mouth daily. Ever Almaguer MD   folic acid/multivit-min/lutein (CENTRUM SILVER PO) Take 1 Tab by mouth daily. Ever Almaguer MD   digoxin (LANOXIN) 0.125 mg tablet Take 0.125 mg by mouth daily. Ever Almaguer MD   apixaban (ELIQUIS) 2.5 mg tablet Take 2.5 mg by mouth two (2) times a day. Ever Almaguer MD   metoprolol succinate (TOPROL-XL) 100 mg tablet Take 100 mg by mouth daily. Ever Almaguer MD   simvastatin (ZOCOR) 20 mg tablet Take 20 mg by mouth nightly. Ever Almaguer MD   torsemide (DEMADEX) 10 mg tablet Take 10 mg by mouth daily. Takes 5 mg every other day    Ever Almaguer MD   cholecalciferol, vitamin D3, (VITAMIN D3) 2,000 unit tab Take 2,000 Int'l Units by mouth daily. Ever Almaguer MD     Allergies   Allergen Reactions    Bactrim [Sulfamethoprim] Hives     No family history on file.   Social History     Socioeconomic History    Marital status:      Spouse name: Not on file    Number of children: Not on file    Years of education: Not on file    Highest education level: Not on file   Occupational History    Not on file   Social Needs    Financial resource strain: Not on file    Food insecurity:     Worry: Not on file     Inability: Not on file   Relayr needs:     Medical: Not on file     Non-medical: Not on file   Tobacco Use    Smoking status: Never Smoker    Smokeless tobacco: Never Used   Substance and Sexual Activity    Alcohol use: Yes     Frequency: Never     Comment: socially     Drug use: Never    Sexual activity: Not on file   Lifestyle    Physical activity:     Days per week: Not on file     Minutes per session: Not on file    Stress: Not on file   Relationships    Social connections:     Talks on phone: Not on file     Gets together: Not on file     Attends [employer] service: Not on file     Active member of club or organization: Not on file     Attends meetings of clubs or organizations: Not on file     Relationship status: Not on file    Intimate partner violence:     Fear of current or ex partner: Not on file     Emotionally abused: Not on file     Physically abused: Not on file     Forced sexual activity: Not on file   Other Topics Concern    Not on file   Social History Narrative    Not on file       Review of Systems:       Derm:  Open ulcer left anterior shin. Musc/skeletal: no bone or joint complains. Vasc: No edema, cyanosis or claudication. Objective:     Physical Assessment:    There were no vitals filed for this visit.   Lower Extremity Exam:    Vascular Exam:  Dorsalis Pedis Pulse:0/4  Bilateral Lower Extremities  Posterior Tibial Pulse: 0/4 Bilateral Lower Extremities  Capillary Refill is < 3 seconds Bilateral Lower Extremities  Temperature of extremity from proximal to distal is warm and perfused Bilateral Lower Extremities  Recent CHARLEE results: None    Integumentary Exam:  Skin Texture is WNL Bilateral Lower Extremities  Pedal Hair is present Bilateral Lower Extremities  Examination of lower extremity reveals open ulcer left lateral anterior shin - all closed  Etiology: Started as a blister -    Wound Description:   Wound Type: Venous stasis   Indication:   Chronic >30days   Status: improving     Measurements:    Wound Length:        Wound Width :       Wound Depth :      Tissue Type: Ulcer bed:  closed    Periwound: Surrounding erythema with intact dermal layer    Exudate: none  Epithelial: with out necrosis  Granulation: with out necrosis  Subcutaneous: with out necrosis  Slough: no  Eschar: no  Tendon: exposed no -with out necrosis  Fascia: exposed no -with out necrosis  Muscle: exposed no -with out necrosis  Bone: exposed no -with out necrosis   Drainage: none    Debridement:  --not required    Infection: no   Edema: no   Compression: no     Offloading:  No     Nicotine/Tobacco Use:  No      Orthopedic Exam:  Musculoskeletal Exam: Muscle strength is 5/5 Bilateral Lower Extremities. Tendon, Muscle and Bone and joints and WNL Bilateral Lower Extremities. Range of motion and strength noted in both ankles is WNL. Neurological Exam:  Ankle deep tendon reflexes: Are WNL Bilateral Lower Extremities  Epicritic and Protective Sensation: Are Intact Bilateral Lower Extremities  Evaluation of lower extremity nerves: Are Intact as seen with 5.07 mm monofilament at 5 points per foot. Laboratory Results:  @ Basic Metabolic Profile@  Lab Results   Component Value Date     03/22/2019    CO2 33 (H) 03/22/2019    BUN 25 (H) 03/22/2019       Data Review: No results found for this or any previous visit (from the past 24 hour(s)).       Assessment:     80 y.o. 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 Needs : Follow up with Nurse visit as recommended. PLEASE CONTACT OFFICE AS SOON AS POSSIBLE IF UNABLE TO MAKE THIS APPOINTMENT. Inspect your wounds, looking for signs of infection which may include the following:  Increase in redness  Red \"streaks\" from wound  Increase in swelling Fever  Unusual odor Change in the amount of wound drainage. Should you experience any significant changes in your wound(s) or have any questions regarding your home care instructions please contact the wound center or your home health company. If after regular business hours, please call your family doctor or local emergency room.           Signed By: Charlene Nichole DPM      May 22, 2019, 4:00 PM

## 2019-06-01 ENCOUNTER — APPOINTMENT (OUTPATIENT)
Dept: GENERAL RADIOLOGY | Age: 84
DRG: 291 | End: 2019-06-01
Attending: PHYSICIAN ASSISTANT
Payer: MEDICARE

## 2019-06-01 ENCOUNTER — HOSPITAL ENCOUNTER (INPATIENT)
Age: 84
LOS: 3 days | Discharge: NURSING, REHAB OR LONG-TERM CARE | DRG: 291 | End: 2019-06-04
Attending: EMERGENCY MEDICINE | Admitting: HOSPITALIST
Payer: MEDICARE

## 2019-06-01 DIAGNOSIS — I50.9 CONGESTIVE HEART FAILURE, UNSPECIFIED HF CHRONICITY, UNSPECIFIED HEART FAILURE TYPE (HCC): ICD-10-CM

## 2019-06-01 DIAGNOSIS — E86.0 DEHYDRATION: Primary | ICD-10-CM

## 2019-06-01 DIAGNOSIS — R53.1 GENERAL WEAKNESS: ICD-10-CM

## 2019-06-01 LAB
ALBUMIN SERPL-MCNC: 2.6 G/DL (ref 3.4–5)
ALBUMIN/GLOB SERPL: 0.7 {RATIO} (ref 0.8–1.7)
ALP SERPL-CCNC: 294 U/L (ref 45–117)
ALT SERPL-CCNC: 26 U/L (ref 16–61)
ANION GAP SERPL CALC-SCNC: 7 MMOL/L (ref 3–18)
APPEARANCE UR: CLEAR
AST SERPL-CCNC: 41 U/L (ref 15–37)
ATRIAL RATE: 60 BPM
BACTERIA URNS QL MICRO: ABNORMAL /HPF
BASOPHILS # BLD: 0 K/UL (ref 0–0.1)
BASOPHILS NFR BLD: 1 % (ref 0–2)
BILIRUB SERPL-MCNC: 1.8 MG/DL (ref 0.2–1)
BILIRUB UR QL: NEGATIVE
BNP SERPL-MCNC: ABNORMAL PG/ML (ref 0–1800)
BUN SERPL-MCNC: 34 MG/DL (ref 7–18)
BUN/CREAT SERPL: 25 (ref 12–20)
C DIFF GDH STL QL: NEGATIVE
C DIFF TOX A+B STL QL IA: NEGATIVE
CALCIUM SERPL-MCNC: 8.4 MG/DL (ref 8.5–10.1)
CALCULATED R AXIS, ECG10: -6 DEGREES
CALCULATED T AXIS, ECG11: -164 DEGREES
CHLORIDE SERPL-SCNC: 102 MMOL/L (ref 100–108)
CK MB CFR SERPL CALC: 2.1 % (ref 0–4)
CK MB SERPL-MCNC: 2.3 NG/ML (ref 5–25)
CK SERPL-CCNC: 108 U/L (ref 39–308)
CO2 SERPL-SCNC: 31 MMOL/L (ref 21–32)
COLOR UR: YELLOW
CREAT SERPL-MCNC: 1.36 MG/DL (ref 0.6–1.3)
DIAGNOSIS, 93000: NORMAL
DIFFERENTIAL METHOD BLD: ABNORMAL
EOSINOPHIL # BLD: 0 K/UL (ref 0–0.4)
EOSINOPHIL NFR BLD: 0 % (ref 0–5)
EPITH CASTS URNS QL MICRO: ABNORMAL /LPF (ref 0–5)
ERYTHROCYTE [DISTWIDTH] IN BLOOD BY AUTOMATED COUNT: 14.6 % (ref 11.6–14.5)
GLOBULIN SER CALC-MCNC: 4 G/DL (ref 2–4)
GLUCOSE SERPL-MCNC: 102 MG/DL (ref 74–99)
GLUCOSE UR STRIP.AUTO-MCNC: NEGATIVE MG/DL
HCT VFR BLD AUTO: 39.7 % (ref 36–48)
HGB BLD-MCNC: 13.3 G/DL (ref 13–16)
HGB UR QL STRIP: NEGATIVE
INTERPRETATION: NORMAL
KETONES UR QL STRIP.AUTO: NEGATIVE MG/DL
LEUKOCYTE ESTERASE UR QL STRIP.AUTO: ABNORMAL
LYMPHOCYTES # BLD: 0.6 K/UL (ref 0.9–3.6)
LYMPHOCYTES NFR BLD: 12 % (ref 21–52)
MCH RBC QN AUTO: 33.4 PG (ref 24–34)
MCHC RBC AUTO-ENTMCNC: 33.5 G/DL (ref 31–37)
MCV RBC AUTO: 99.7 FL (ref 74–97)
MONOCYTES # BLD: 0.7 K/UL (ref 0.05–1.2)
MONOCYTES NFR BLD: 13 % (ref 3–10)
NEUTS SEG # BLD: 3.9 K/UL (ref 1.8–8)
NEUTS SEG NFR BLD: 74 % (ref 40–73)
NITRITE UR QL STRIP.AUTO: NEGATIVE
PH UR STRIP: 5.5 [PH] (ref 5–8)
PLATELET # BLD AUTO: 148 K/UL (ref 135–420)
PMV BLD AUTO: 12.3 FL (ref 9.2–11.8)
POTASSIUM SERPL-SCNC: 4.1 MMOL/L (ref 3.5–5.5)
PROT SERPL-MCNC: 6.6 G/DL (ref 6.4–8.2)
PROT UR STRIP-MCNC: ABNORMAL MG/DL
Q-T INTERVAL, ECG07: 444 MS
QRS DURATION, ECG06: 184 MS
QTC CALCULATION (BEZET), ECG08: 469 MS
RBC # BLD AUTO: 3.98 M/UL (ref 4.7–5.5)
RBC #/AREA URNS HPF: ABNORMAL /HPF (ref 0–5)
SODIUM SERPL-SCNC: 140 MMOL/L (ref 136–145)
SP GR UR REFRACTOMETRY: 1.01 (ref 1–1.03)
TROPONIN I SERPL-MCNC: 0.09 NG/ML (ref 0–0.04)
UROBILINOGEN UR QL STRIP.AUTO: 1 EU/DL (ref 0.2–1)
VENTRICULAR RATE, ECG03: 67 BPM
WBC # BLD AUTO: 5.2 K/UL (ref 4.6–13.2)
WBC URNS QL MICRO: ABNORMAL /HPF (ref 0–5)

## 2019-06-01 PROCEDURE — 83880 ASSAY OF NATRIURETIC PEPTIDE: CPT

## 2019-06-01 PROCEDURE — 96360 HYDRATION IV INFUSION INIT: CPT

## 2019-06-01 PROCEDURE — 74011250636 HC RX REV CODE- 250/636: Performed by: PHYSICIAN ASSISTANT

## 2019-06-01 PROCEDURE — 85025 COMPLETE CBC W/AUTO DIFF WBC: CPT

## 2019-06-01 PROCEDURE — 80053 COMPREHEN METABOLIC PANEL: CPT

## 2019-06-01 PROCEDURE — 87045 FECES CULTURE AEROBIC BACT: CPT

## 2019-06-01 PROCEDURE — 77030011943

## 2019-06-01 PROCEDURE — 65660000000 HC RM CCU STEPDOWN

## 2019-06-01 PROCEDURE — 82550 ASSAY OF CK (CPK): CPT

## 2019-06-01 PROCEDURE — 81001 URINALYSIS AUTO W/SCOPE: CPT

## 2019-06-01 PROCEDURE — 71045 X-RAY EXAM CHEST 1 VIEW: CPT

## 2019-06-01 PROCEDURE — 99285 EMERGENCY DEPT VISIT HI MDM: CPT

## 2019-06-01 PROCEDURE — 74011250637 HC RX REV CODE- 250/637: Performed by: HOSPITALIST

## 2019-06-01 PROCEDURE — 93005 ELECTROCARDIOGRAM TRACING: CPT

## 2019-06-01 PROCEDURE — 87449 NOS EACH ORGANISM AG IA: CPT

## 2019-06-01 RX ORDER — TORSEMIDE 20 MG/1
10 TABLET ORAL DAILY
Status: DISCONTINUED | OUTPATIENT
Start: 2019-06-02 | End: 2019-06-04 | Stop reason: HOSPADM

## 2019-06-01 RX ORDER — GUAIFENESIN 100 MG/5ML
81 LIQUID (ML) ORAL DAILY
Status: DISCONTINUED | OUTPATIENT
Start: 2019-06-02 | End: 2019-06-04 | Stop reason: HOSPADM

## 2019-06-01 RX ORDER — DIGOXIN 125 MCG
0.12 TABLET ORAL DAILY
Status: DISCONTINUED | OUTPATIENT
Start: 2019-06-02 | End: 2019-06-04 | Stop reason: HOSPADM

## 2019-06-01 RX ORDER — LISINOPRIL 5 MG/1
2.5 TABLET ORAL DAILY
Status: DISCONTINUED | OUTPATIENT
Start: 2019-06-02 | End: 2019-06-03

## 2019-06-01 RX ORDER — METOPROLOL SUCCINATE 100 MG/1
100 TABLET, EXTENDED RELEASE ORAL DAILY
Status: DISCONTINUED | OUTPATIENT
Start: 2019-06-02 | End: 2019-06-04 | Stop reason: HOSPADM

## 2019-06-01 RX ORDER — SIMVASTATIN 20 MG/1
20 TABLET, FILM COATED ORAL
Status: DISCONTINUED | OUTPATIENT
Start: 2019-06-01 | End: 2019-06-04 | Stop reason: HOSPADM

## 2019-06-01 RX ADMIN — SODIUM CHLORIDE 500 ML: 900 INJECTION, SOLUTION INTRAVENOUS at 13:21

## 2019-06-01 RX ADMIN — SIMVASTATIN 20 MG: 20 TABLET, FILM COATED ORAL at 21:52

## 2019-06-01 RX ADMIN — APIXABAN 2.5 MG: 2.5 TABLET, FILM COATED ORAL at 21:52

## 2019-06-01 RX ADMIN — SODIUM CHLORIDE 500 ML: 900 INJECTION, SOLUTION INTRAVENOUS at 14:19

## 2019-06-01 NOTE — ED NOTES
Patient is brought to the ED by his wife and son with c/o dehydration and increased weakness x 2 days. Patient is increasingly weak and lethargic and is unable to ambulate on his own. Patient's skin and mouth is excessively dry. Patient has CHF and is on a restricted sodium and fluid intake.

## 2019-06-01 NOTE — ED NOTES
TRANSFER - OUT REPORT:    Verbal report given to Chiquis Meza RN (name) on Salmaiann Corpus  being transferred to Telemetry (unit) for routine progression of care       Report consisted of patients Situation, Background, Assessment and   Recommendations(SBAR). Information from the following report(s) SBAR was reviewed with the receiving nurse. Lines:   Peripheral IV 06/01/19 Right Antecubital (Active)   Site Assessment Clean, dry, & intact 6/1/2019  1:10 PM   Phlebitis Assessment 0 6/1/2019  1:10 PM   Infiltration Assessment 0 6/1/2019  1:10 PM   Dressing Status Clean, dry, & intact 6/1/2019  1:10 PM        Opportunity for questions and clarification was provided.       Patient transported with:   Spry

## 2019-06-01 NOTE — ED NOTES
Attempted to catheterize patient for urine sample with another RN. Was unable to pull foreskin back d/t swelling. Provider notified. Per ALICIA Church, urinal placed to catch specimen if patient should void.

## 2019-06-01 NOTE — ED NOTES
Called telemetry to give report and nurse unable to take report at this time. Will receive a call back.

## 2019-06-01 NOTE — ED PROVIDER NOTES
EMERGENCY DEPARTMENT HISTORY AND PHYSICAL EXAM    Date: 6/1/2019  Patient Name: Mary Santacruz    History of Presenting Illness     No chief complaint on file. History Provided By: Patient and his wife    Mary Santacruz is a 80 y.o. male with PMHX of CHF, hypertension, atrial fibrillation who presents to the emergency department via private vehicle from [address]  c/O generalized weakness. Associated sxs include joint aches, dry mouth, and diarrhea. Patient reports being seen in this facility approximately 3 months ago for bilateral lower extremity edema and was admitted to the hospital for CHF exacerbation. Patient states upon discharge in the hospital he was put on a water and salt restriction. Patient and his wife that they have been very strict monitoring the amount of salt and water that he has been in taking. Patient states that discharge patient was put on a short course of antibiotics by the PCP which patient reacted poorly to. Patient's wife that he had a severe allergic reaction the antibiotics were stopped. Patient's wife that he is continued to have diarrhea since using biotics. Patient's PCP is notified of the diarrhea suggested that the patient take Imodium when the diarrhea occurs. After discharge in the hospital patient has been using a walker seems to be up and doing well over the last several weeks but acutely over the last 2 days he has become progressively more weak and states that his joints hurt so he is been able to get out of bed. He states he feeling a little bit better today joint pains not as severe however son felt patient to be evaluated for possible dehydration which prompted ER visit. Pt denies chest pain, shortness of breath, abdominal pain, vomiting, bloody or black stools, fever, and any other sxs or complaints.      PCP: Aminta Alcantara MD    Current Outpatient Medications   Medication Sig Dispense Refill    lisinopril (PRINIVIL, ZESTRIL) 2.5 mg tablet Take 1 Tab by mouth daily. (Patient not taking: Reported on 4/23/2019) 30 Tab 0    aspirin 81 mg chewable tablet Take 81 mg by mouth daily.  folic acid/multivit-min/lutein (CENTRUM SILVER PO) Take 1 Tab by mouth daily.  digoxin (LANOXIN) 0.125 mg tablet Take 0.125 mg by mouth daily.  apixaban (ELIQUIS) 2.5 mg tablet Take 2.5 mg by mouth two (2) times a day.  metoprolol succinate (TOPROL-XL) 100 mg tablet Take 100 mg by mouth daily.  simvastatin (ZOCOR) 20 mg tablet Take 20 mg by mouth nightly.  torsemide (DEMADEX) 10 mg tablet Take 10 mg by mouth daily. Takes 5 mg every other day      cholecalciferol, vitamin D3, (VITAMIN D3) 2,000 unit tab Take 2,000 Int'l Units by mouth daily. Past History     Past Medical History:  Past Medical History:   Diagnosis Date    CHF (congestive heart failure) ([facility] [address]     Hypertension        Past Surgical History:  Past Surgical History:   Procedure Laterality Date    HX AORTIC VALVE REPLACEMENT         Family History:  History reviewed. No pertinent family history. Social History:  Social History     Tobacco Use    Smoking status: Never Smoker    Smokeless tobacco: Never Used   Substance Use Topics    Alcohol use: Yes     Frequency: Never     Comment: socially     Drug use: Never       Allergies: Allergies   Allergen Reactions    Bactrim [Sulfamethoprim] Hives         Review of Systems   Review of Systems   Constitutional: Negative for fever. HENT: Negative for congestion. Respiratory: Negative for cough and shortness of breath. Cardiovascular: Negative for chest pain and leg swelling. Gastrointestinal: Positive for diarrhea. Negative for abdominal pain, blood in stool, nausea and vomiting. Neurological: Positive for weakness. Negative for syncope and numbness. All other systems reviewed and are negative.       Physical Exam     Vitals:    06/01/19 1445 06/01/19 1500 06/01/19 1515 06/01/19 1530   BP: 114/77 120/85 129/74 (!) 134/104   Pulse: 83 73 75 75   Resp: 27 16 30 18   Temp:       SpO2: 98% 90%     Weight:       Height:         Physical Exam   Constitutional: He is oriented to person, place, and time. He appears well-developed and well-nourished. Elderly thin very dry appearing   HENT:   Head: Normocephalic and atraumatic. Mouth/Throat: Mucous membranes are dry. Eyes: Pupils are equal, round, and reactive to light. Conjunctivae and EOM are normal.   Neck: Normal range of motion. Neck supple. Cardiovascular: Normal rate and regular rhythm. Pulmonary/Chest: Effort normal and breath sounds normal.   Abdominal: Soft. Bowel sounds are normal. He exhibits no distension. There is no tenderness. There is no rebound and no guarding. Musculoskeletal: Normal range of motion. He exhibits no edema or tenderness. Support hose noted bilateral lower extremities   Neurological: He is alert and oriented to person, place, and time. Skin: Skin is warm and dry. Psychiatric: He has a normal mood and affect. His behavior is normal.   Nursing note and vitals reviewed.         Diagnostic Study Results     Labs -     Recent Results (from the past 12 hour(s))   EKG, 12 LEAD, INITIAL    Collection Time: 06/01/19 12:54 PM   Result Value Ref Range    Ventricular Rate 67 BPM    Atrial Rate 60 BPM    QRS Duration 184 ms    Q-T Interval 444 ms    QTC Calculation (Bezet) 469 ms    Calculated R Axis -6 degrees    Calculated T Axis -164 degrees    Diagnosis       Atrial fibrillation  Left bundle branch block  Abnormal ECG    Confirmed by Femi Mota MD, [facility] ([id number]) on 6/1/2019 1:44:40 PM     CBC WITH AUTOMATED DIFF    Collection Time: 06/01/19  1:08 PM   Result Value Ref Range    WBC 5.2 4.6 - 13.2 K/uL    RBC 3.98 (L) 4.70 - 5.50 M/uL    HGB 13.3 13.0 - 16.0 g/dL    HCT 39.7 36.0 - 48.0 %    MCV 99.7 (H) 74.0 - 97.0 FL    MCH 33.4 24.0 - 34.0 PG    MCHC 33.5 31.0 - 37.0 g/dL    RDW 14.6 (H) 11.6 - 14.5 %    PLATELET 618 115 - 420 K/uL    MPV 12.3 (H) 9.2 - 11.8 FL    NEUTROPHILS 74 (H) 40 - 73 %    LYMPHOCYTES 12 (L) 21 - 52 %    MONOCYTES 13 (H) 3 - 10 %    EOSINOPHILS 0 0 - 5 %    BASOPHILS 1 0 - 2 %    ABS. NEUTROPHILS 3.9 1.8 - 8.0 K/UL    ABS. LYMPHOCYTES 0.6 (L) 0.9 - 3.6 K/UL    ABS. MONOCYTES 0.7 0.05 - 1.2 K/UL    ABS. EOSINOPHILS 0.0 0.0 - 0.4 K/UL    ABS. BASOPHILS 0.0 0.0 - 0.1 K/UL    DF AUTOMATED     METABOLIC PANEL, COMPREHENSIVE    Collection Time: 06/01/19  1:08 PM   Result Value Ref Range    Sodium 140 136 - 145 mmol/L    Potassium 4.1 3.5 - 5.5 mmol/L    Chloride 102 100 - 108 mmol/L    CO2 31 21 - 32 mmol/L    Anion gap 7 3.0 - 18 mmol/L    Glucose 102 (H) 74 - 99 mg/dL    BUN 34 (H) 7.0 - 18 MG/DL    Creatinine 1.36 (H) 0.6 - 1.3 MG/DL    BUN/Creatinine ratio 25 (H) 12 - 20      GFR est AA 60 (L) >60 ml/min/1.73m2    GFR est non-AA 49 (L) >60 ml/min/1.73m2    Calcium 8.4 (L) 8.5 - 10.1 MG/DL    Bilirubin, total 1.8 (H) 0.2 - 1.0 MG/DL    ALT (SGPT) 26 16 - 61 U/L    AST (SGOT) 41 (H) 15 - 37 U/L    Alk. phosphatase 294 (H) 45 - 117 U/L    Protein, total 6.6 6.4 - 8.2 g/dL    Albumin 2.6 (L) 3.4 - 5.0 g/dL    Globulin 4.0 2.0 - 4.0 g/dL    A-G Ratio 0.7 (L) 0.8 - 1.7     CARDIAC PANEL,(CK, CKMB & TROPONIN)    Collection Time: 06/01/19  1:08 PM   Result Value Ref Range     39 - 308 U/L    CK - MB 2.3 <3.6 ng/ml    CK-MB Index 2.1 0.0 - 4.0 %    Troponin-I, QT 0.09 (H) 0.0 - 0.045 NG/ML   NT-PRO BNP    Collection Time: 06/01/19  1:08 PM   Result Value Ref Range    NT pro-BNP 51,527 (H) 0 - 1,800 PG/ML       Radiologic Studies -  XR CHEST PORT   Final Result   IMPRESSION:      Bilateral lower lobe subsegmental atelectasis, edema, or developing pneumonia. CT Results  (Last 48 hours)    None        CXR Results  (Last 48 hours)               06/01/19 1333  XR CHEST PORT Final result    Impression:  IMPRESSION:       Bilateral lower lobe subsegmental atelectasis, edema, or developing pneumonia.        Narrative: EXAM: One view chest x-ray       CLINICAL INDICATION/HISTORY: Dyspnea. COMPARISON: 03/20/2019. TECHNIQUE: Single AP view of the chest was obtained.       _______________       FINDINGS:       HEART, VESSELS, MEDIASTINUM: Heart size is enlarged, but stable. There is   atherosclerosis of the thoracic aorta. LUNGS, PLEURAL SPACES: There are bilateral lower lung airspace and interstitial   opacities. No effusion or pneumothorax. BONY THORAX, SOFT TISSUES: Sternotomy wires and mediastinal surgical clips   noted. _______________               EKG interpretation: (Preliminary)  1:38 PM   Atrial fibrillation rate 67 left bundle branch block noted no change from previous EKG          Medications given in the ED-  Medications   sodium chloride 0.9 % bolus infusion 500 mL (0 mL IntraVENous IV Completed 6/1/19 1346)   sodium chloride 0.9 % bolus infusion 500 mL (0 mL IntraVENous IV Completed 6/1/19 1442)         Medical Decision Making   I am the first provider for this patient. I reviewed the vital signs, available nursing notes, past medical history, past surgical history, family history and social history. Vital Signs-Reviewed the patient's vital signs. Records Reviewed: Nursing Notes & EMR    Per D/C Note 3/2019  Hospital Course :  Since he was admitted, iv lasix was given for chf exacerbation. Electrolytes and renal function were monitoring. His sob was back to his baseline. K was placed. Echo done with ef 16-20%. He f/u with Dr Andrade-cardiologist as out-pt. Low dose lisinopril was added. He needs continue to f/u with Dr. Geovanna Morales closely. His afib was controlled per digoxin and metoprolol. He also has venous deficiency with earlier cellulitis. rocephin was started, he needs to f/u with wound clinic. Will continue augmentin for it. Discharge planning discussed with patient and family-wife, agree with the plan and no questions and concerns at this point. Procedures:  Procedures    ED Course:   12:49 PM   Initial assessment performed. The patients presenting problems have been discussed, and they are in agreement with the care plan formulated and outlined with them. I have encouraged them to ask questions as they arise throughout their visit. 4:07 PM  Consult with Dr Dennis Fuentes who agrees with consult Hospitalist for admission    4:13 PM   Consult with Dr. Kirit Ward hospitalist agrees to admit to telemetry service. Patient to be admitted to the telemetry floor floor UA still pending at this time      Core Measures:  For Hospitalized Patients:    1. Hospitalization Decision Time:  The decision to hospitalize the patient was made by SPENCER Reyes  at 4:25 PM  on 6/1/2019    2. Aspirin: Aspirin was not given because the patient did not present with a stroke at the time of their Emergency Department evaluation    4:24 PM  Patient is being admitted to the hospital by Dr Kirit Ward. The results of their tests and reasons for their admission have been discussed with them and/or available family. They convey agreement and understanding for the need to be admitted and for their admission diagnosis. CONDITIONS ON ADMISSION:  Sepsis is not present at the time of admission. Deep Vein Thrombosis is not present at the time of admission. Thrombosis is not present at the time of admission. MRSA is not present at the time of admission. Wound infection is not present at the time of admission. Pressure Ulcer is not present at the time of admission. CLINICAL IMPRESSION:    1. Dehydration    2. Congestive heart failure, unspecified HF chronicity, unspecified heart failure type ([address]    3. General weakness              Please note that this dictation was completed with [employer], the computer voice recognition software. Quite often unanticipated grammatical, syntax, homophones, and other interpretive errors are inadvertently transcribed by the computer software.   Please disregard these errors. Please excuse any errors that have escaped final proofreading.

## 2019-06-01 NOTE — ED TRIAGE NOTES
Wife states admitted in march for peripheral edma. Not feeling well since. Pt was put on an antibiotic had an allergic reaction, pt has been weak since then, difficulty walking. Pt is on restricted fluid intake as well as sodium per wife, thurs began having stiffness in legs, unable to get out of bed.

## 2019-06-01 NOTE — PROGRESS NOTES
1720:  Received telephone report from Glo Johnson RN. Awaiting patient arrival to unit. 1737:  Patient arrived to unit with Ken Hensley, medic via bed from ED. Bedside skin assessment performed with Yury Berkowitz RN, redness to sacrum and skin tear to right elbow with mepilex already applied. Mepilex placed on sacrum. Patient has no complaints of pain or discomfort at the time. Patient oriented to room, bed, television, telephone, bathroom, and call bell. Whiteboard updated, bed at the lowest position with call bell within reach. Bedside and Verbal shift change report given to Doris Alberto RN (oncoming nurse) by Hector Hickey RN   (offgoing nurse). Report included the following information SBAR, Kardex, ED Summary, Procedure Summary, Intake/Output, MAR, Recent Results, Med Rec Status, Cardiac Rhythm Afib with BBB and Alarm Parameters .

## 2019-06-01 NOTE — H&P
History & Physical    Patient: Gabbie Connors MRN: [id number]  CSN: [id number]    YOB: 1929  Age: 80 y.o. Sex: male      DOA: 6/1/2019  Primary Care Provider:  Park Hernández MD      Assessment/Plan     Patient Active Problem List   Diagnosis Code    Acute on chronic combined systolic and diastolic congestive heart failure (HCC) I50.43    Aortic valve stenosis I35.0    Atrial fibrillation (HCC) I48.91    Chronic anemia D64.9    Essential hypertension I10    History of aortic valve replacement Z95.2    History of coronary artery bypass surgery Z95.1    Premature atrial contraction I49.1    Left bundle branch block (LBBB) I44.7    Venous insufficiency of both lower extremities I87.2    Dehydration E86.0       Admit to telemetry    Acute on chronic systolic diastolic CHF - last echo with EF of 54-92%, grade 2 diastolic dysfunction, mod to severe MR, mild to mod pulm HTN. He is on dry side, his mouth is dry and on the other had his CXR shows bilateral lower lobe subsegmental edema, he also has some LE edema. At this point we will continue with his home medications, he has received 500 mL bolus x 2. Have to be very cautious with fluids, will access again tomorrow and decide the need of fluid bolus. I had long discussion with patient, wife and son at bedside. Discussed poor prognosis given very low EF. Discussed hospice. Discussed code status, he is DNR    A-fib - continue digoxin, anticoagulation with eliquis. Will get palliative care consult. PT/OT    I have had a discussion with patient  regarding code status. Particularly, described potential options in event of cardiac or respiratory arrest. I have explained what being full code entails, including cardiorespiratory resuscitation attempts with chest compressions, potential cariodioversion/ \" shocks\" as well as intubation.  I have also explained Do not resuscitate which would mean we allow a natural death with out aggressive interventions. He is DNR  35 mins    Estimated length of stay : 2-3 days    CC: weakness       HPI:     Carmita Tom is a 80 y.o. male who has past history of CHF, HTN, A-fib, valve replacement is brought to ER with concerns of weakness. Most of the history obtained from wife at bedside. Wife reports that since discharge from [facility] in March, patient was doing fine, over the last week he has been getting progressively weak, he is now not able to get out of bed and requiring a lot of assistance. He appeared dehydrated. Patient as such denies any complains except feeling weak all over. In ER work up did not show any acute findings. He appeared dehydrated and was given fluid boluses. Admission requested for further management    Past Medical History:   Diagnosis Date    CHF (congestive heart failure) ([address]     Hypertension        Past Surgical History:   Procedure Laterality Date    HX AORTIC VALVE REPLACEMENT         History reviewed. No pertinent family history. Social History     Socioeconomic History    Marital status:      Spouse name: Not on file    Number of children: Not on file    Years of education: Not on file    Highest education level: Not on file   Tobacco Use    Smoking status: Never Smoker    Smokeless tobacco: Never Used   Substance and Sexual Activity    Alcohol use: Yes     Frequency: Never     Comment: socially     Drug use: Never       Prior to Admission medications    Medication Sig Start Date End Date Taking? Authorizing Provider   lisinopril (PRINIVIL, ZESTRIL) 2.5 mg tablet Take 1 Tab by mouth daily. Patient not taking: Reported on 4/23/2019 3/22/19   Vincent Jimenez MD   aspirin 81 mg chewable tablet Take 81 mg by mouth daily. Ever Almaguer MD   folic acid/multivit-min/lutein (CENTRUM SILVER PO) Take 1 Tab by mouth daily. Ever Almaguer MD   digoxin (LANOXIN) 0.125 mg tablet Take 0.125 mg by mouth daily.     Ever Almaguer MD   apixaban (ELIQUIS) 2.5 mg tablet Take 2.5 mg by mouth two (2) times a day. Ever Almaguer MD   metoprolol succinate (TOPROL-XL) 100 mg tablet Take 100 mg by mouth daily. Ever Almaguer MD   simvastatin (ZOCOR) 20 mg tablet Take 20 mg by mouth nightly. Ever Almaguer MD   torsemide (DEMADEX) 10 mg tablet Take 10 mg by mouth daily. Takes 5 mg every other day    Ever Almaguer MD   cholecalciferol, vitamin D3, (VITAMIN D3) 2,000 unit tab Take 2,000 Int'l Units by mouth daily. Ever Almaguer MD       Allergies   Allergen Reactions    Bactrim [Sulfamethoprim] Hives       Review of Systems  Gen: No fever, chills, malaise, weight loss/gain. Heent: No headache, rhinorrhea, epistaxis, ear pain, hearing loss, sinus pain, neck pain/stiffness, sore throat. Heart: No chest pain, palpitations, WHITESIDE, pnd, or orthopnea. Resp: No cough, hemoptysis, wheezing and shortness of breath. GI: No nausea, vomiting, diarrhea, constipation, melena or hematochezia. : No urinary obstruction, dysuria or hematuria. Derm: No rash, new skin lesion or pruritis. Musc/skeletal: no bone or joint complains. Vasc: No edema, cyanosis or claudication. Endo: No heat/cold intolerance, no polyuria,polydipsia or polyphagia. Neuro: No unilateral weakness, numbness, tingling. No seizures. Heme: No easy bruising or bleeding. Physical Exam:     Physical Exam:  Visit Vitals  /72 (BP 1 Location: Left arm, BP Patient Position: At rest;Supine)   Pulse 65   Temp 97.3 °F (36.3 °C)   Resp 22   Ht 5' 7\" (1.702 m)   Wt 60.8 kg (134 lb)   SpO2 95%   BMI 20.99 kg/m²      O2 Device: Room air    Temp (24hrs), Av.3 °F (36.3 °C), Min:96.6 °F (35.9 °C), Max:97.9 °F (36.6 °C)     0701 -  1900  In: 1000 [I.V.:1000]  Out: -    No intake/output data recorded. General:  Awake, cooperative, no distress. Head:  Normocephalic, without obvious abnormality, atraumatic. Eyes:  Conjunctivae/corneas clear, sclera anicteric, PERRL, EOMs intact.    Nose: Nares normal. No drainage or sinus tenderness. Throat: Lips, mucosa, and tongue dry. Neck: Supple, symmetrical, trachea midline, no adenopathy. Lungs:   Rales lower lungs bilaterally. Heart:   S1, S2, no murmur, click, rub or gallop. Abdomen: Soft, non-tender. Bowel sounds normal. No masses,  No organomegaly. Extremities: Extremities normal, atraumatic, no cyanosis or edema. Capillary refill normal.   Pulses: 2+ and symmetric all extremities. Skin: Skin color pink,dry. No rashes or lesions   Neurologic: CNII-XII intact. No focal motor or sensory deficit.        Labs Reviewed:    CMP:   Lab Results   Component Value Date/Time     06/01/2019 01:08 PM    K 4.1 06/01/2019 01:08 PM     06/01/2019 01:08 PM    CO2 31 06/01/2019 01:08 PM    AGAP 7 06/01/2019 01:08 PM     (H) 06/01/2019 01:08 PM    BUN 34 (H) 06/01/2019 01:08 PM    CREA 1.36 (H) 06/01/2019 01:08 PM    GFRAA 60 (L) 06/01/2019 01:08 PM    GFRNA 49 (L) 06/01/2019 01:08 PM    CA 8.4 (L) 06/01/2019 01:08 PM    ALB 2.6 (L) 06/01/2019 01:08 PM    TP 6.6 06/01/2019 01:08 PM    GLOB 4.0 06/01/2019 01:08 PM    AGRAT 0.7 (L) 06/01/2019 01:08 PM    SGOT 41 (H) 06/01/2019 01:08 PM    ALT 26 06/01/2019 01:08 PM     CBC:   Lab Results   Component Value Date/Time    WBC 5.2 06/01/2019 01:08 PM    HGB 13.3 06/01/2019 01:08 PM    HCT 39.7 06/01/2019 01:08 PM     06/01/2019 01:08 PM     All Cardiac Markers in the last 24 hours:   Lab Results   Component Value Date/Time     06/01/2019 01:08 PM    CKMB 2.3 06/01/2019 01:08 PM    CKND1 2.1 06/01/2019 01:08 PM    TROIQ 0.09 (H) 06/01/2019 01:08 PM         Procedures/imaging: see electronic medical records for all procedures/Xrays and details which were not copied into this note but were reviewed prior to creation of Plan        CC: Dmitriy Rivas MD

## 2019-06-02 LAB
ANION GAP SERPL CALC-SCNC: 7 MMOL/L (ref 3–18)
BUN SERPL-MCNC: 33 MG/DL (ref 7–18)
BUN/CREAT SERPL: 28 (ref 12–20)
CALCIUM SERPL-MCNC: 8.6 MG/DL (ref 8.5–10.1)
CHLORIDE SERPL-SCNC: 104 MMOL/L (ref 100–108)
CO2 SERPL-SCNC: 30 MMOL/L (ref 21–32)
CREAT SERPL-MCNC: 1.18 MG/DL (ref 0.6–1.3)
ERYTHROCYTE [DISTWIDTH] IN BLOOD BY AUTOMATED COUNT: 14.8 % (ref 11.6–14.5)
GLUCOSE SERPL-MCNC: 83 MG/DL (ref 74–99)
HCT VFR BLD AUTO: 42.2 % (ref 36–48)
HGB BLD-MCNC: 14.2 G/DL (ref 13–16)
MCH RBC QN AUTO: 33.5 PG (ref 24–34)
MCHC RBC AUTO-ENTMCNC: 33.6 G/DL (ref 31–37)
MCV RBC AUTO: 99.5 FL (ref 74–97)
PLATELET # BLD AUTO: 163 K/UL (ref 135–420)
PMV BLD AUTO: 12.5 FL (ref 9.2–11.8)
POTASSIUM SERPL-SCNC: 3.7 MMOL/L (ref 3.5–5.5)
RBC # BLD AUTO: 4.24 M/UL (ref 4.7–5.5)
SODIUM SERPL-SCNC: 141 MMOL/L (ref 136–145)
WBC # BLD AUTO: 4.9 K/UL (ref 4.6–13.2)

## 2019-06-02 PROCEDURE — 77030011256 HC DRSG MEPILEX <16IN NO BORD MOLN -A

## 2019-06-02 PROCEDURE — 97161 PT EVAL LOW COMPLEX 20 MIN: CPT

## 2019-06-02 PROCEDURE — 65660000000 HC RM CCU STEPDOWN

## 2019-06-02 PROCEDURE — 36415 COLL VENOUS BLD VENIPUNCTURE: CPT

## 2019-06-02 PROCEDURE — 80048 BASIC METABOLIC PNL TOTAL CA: CPT

## 2019-06-02 PROCEDURE — 85027 COMPLETE CBC AUTOMATED: CPT

## 2019-06-02 PROCEDURE — 74011250637 HC RX REV CODE- 250/637: Performed by: HOSPITALIST

## 2019-06-02 RX ADMIN — APIXABAN 2.5 MG: 2.5 TABLET, FILM COATED ORAL at 21:38

## 2019-06-02 RX ADMIN — ASPIRIN 81 MG 81 MG: 81 TABLET ORAL at 09:14

## 2019-06-02 RX ADMIN — TORSEMIDE 10 MG: 20 TABLET ORAL at 09:13

## 2019-06-02 RX ADMIN — SIMVASTATIN 20 MG: 20 TABLET, FILM COATED ORAL at 21:38

## 2019-06-02 RX ADMIN — APIXABAN 2.5 MG: 2.5 TABLET, FILM COATED ORAL at 09:14

## 2019-06-02 RX ADMIN — DIGOXIN 0.12 MG: 125 TABLET ORAL at 09:14

## 2019-06-02 RX ADMIN — METOPROLOL SUCCINATE 100 MG: 100 TABLET, EXTENDED RELEASE ORAL at 09:14

## 2019-06-02 NOTE — PROGRESS NOTES
Hospitalist Progress Note    Patient: Bridgette Dunn MRN: [id number]  CSN: [id number]    YOB: 1929  Age: [address]. Sex: male    DOA: 6/1/2019 LOS:  LOS: 1 day                Assessment/Plan     Patient Active Problem List   Diagnosis Code    Acute on chronic combined systolic and diastolic congestive heart failure (HCC) I50.43    Aortic valve stenosis I35.0    Atrial fibrillation (HCC) I48.91    Chronic anemia D64.9    Essential hypertension I10    History of aortic valve replacement Z95.2    History of coronary artery bypass surgery Z95.1    Premature atrial contraction I49.1    Left bundle branch block (LBBB) I44.7    Venous insufficiency of both lower extremities I87.2    Dehydration E86.0        [address] yo male admitted for weakness, acute on chronic CHF    Feels better, worked with PT. Acute on chronic systolic diastolic CHF - last echo with EF of 35-56%, grade 2 diastolic dysfunction, mod to severe MR, mild to mod pulm HTN. Received 500mL bolus x 2 in ER. Continue with diuresis   I had long discussion with patient, wife and sons at bedside. Discussed poor prognosis given very low EF. he is DNR, wife wants discharge to rehab.     A-fib - continue digoxin, anticoagulation with eliquis. CAD - no chest pain     Will get palliative care consult.      PT/OT           Disposition : 1-2 days    Review of systems  General: No fevers or chills. Cardiovascular: No chest pain or pressure. No palpitations. Pulmonary: No shortness of breath. Gastrointestinal: No nausea, vomiting. Physical Exam:  General: Awake, frail.    HEENT: NC, Atraumatic. PERRLA, anicteric sclerae. Lungs: Rales lower lungs Bilaterally. Heart:  S1 S2,  No murmur, No Rubs, No Gallops  Abdomen: Soft, Non distended, Non tender.  +Bowel sounds,   Extremities: Edema LE bilaterally  Psych:   Not anxious or agitated. Neurologic:  No acute neurological deficit.            Vital signs/Intake and Output:  Visit Vitals  /54   Pulse (!) 57   Temp 97.5 °F (36.4 °C)   Resp 18   Ht 5' 7\" (1.702 m)   Wt 62.3 kg (137 lb 6.4 oz)   SpO2 95%   BMI 21.52 kg/m²     Current Shift:  06/02 0701 - 06/02 1900  In: 240 [P.O.:240]  Out: -   Last three shifts:  05/31 1901 - 06/02 0700  In: 1240 [P.O.:240; I.V.:1000]  Out: 350 [Urine:350]            Labs: Results:       Chemistry Recent Labs     06/02/19  0330 06/01/19  1308   GLU 83 102*    140   K 3.7 4.1    102   CO2 30 31   BUN 33* 34*   CREA 1.18 1.36*   CA 8.6 8.4*   AGAP 7 7   BUCR 28* 25*   AP  --  294*   TP  --  6.6   ALB  --  2.6*   GLOB  --  4.0   AGRAT  --  0.7*      CBC w/Diff Recent Labs     06/02/19  0330 06/01/19  1308   WBC 4.9 5.2   RBC 4.24* 3.98*   HGB 14.2 13.3   HCT 42.2 39.7    148   GRANS  --  74*   LYMPH  --  12*   EOS  --  0      Cardiac Enzymes Recent Labs     06/01/19  1308      CKND1 2.1      Coagulation No results for input(s): PTP, INR, APTT in the last 72 hours. No lab exists for component: INREXT    Lipid Panel No results found for: CHOL, CHOLPOCT, CHOLX, CHLST, CHOLV, 545736, HDL, LDL, LDLC, DLDLP, 634985, VLDLC, VLDL, TGLX, TRIGL, TRIGP, TGLPOCT, CHHD, CHHDX   BNP No results for input(s): BNPP in the last 72 hours.    Liver Enzymes Recent Labs     06/01/19  1308   TP 6.6   ALB 2.6*   *   SGOT 41*      Thyroid Studies No results found for: T4, T3U, TSH, TSHEXT     Procedures/imaging: see electronic medical records for all procedures/Xrays and details which were not copied into this note but were reviewed prior to creation of Plan

## 2019-06-02 NOTE — PROGRESS NOTES
Problem: Mobility Impaired (Adult and Pediatric)  Goal: *Acute Goals and Plan of Care (Insert Text)  Description  Physical Therapy Goals   Initiated 6/2/2019 and to be accomplished within 5-7 day(s)  1. Patient will move from supine <> sit with S in prep for out of bed activity and change of position. 2.  Patient will perform sit<> stand with S with LRAD in prep for transfers/ambulation. 3.  Patient will transfer from bed <> chair with S with LRAD for time up in chair for completion of ADL activity. 4.  Patient will ambulate 150 feet with LRAD/S for improved functional mobility/safe discharge. Outcome: Progressing Towards Goal   PHYSICAL THERAPY EVALUATION    Patient: Jeanie Sorensen (86 y.o. male)  Date: 6/2/2019  Primary Diagnosis: Dehydration [E86.0]        Precautions: Fall    ASSESSMENT :  Based on the objective data described below, the patient presents with decrease independence w/ bed mobility, transfers, gait, and step negotiation. Pt seen in supine prior to session w/ Nursing staff present assisting with cleaning the pt. Pt reported no pain at this time. Pt reports PTA that he was Mod I w/ RW. Upon standing w/ RW/GB but demonstrated difficulty w/ stability and required VCs to maintain upright posture as pt demonstrated a flexed trunk and posterior COG, and narrow EDENILSON. Pt initially unsteady w/ gait but as pt continued to ambulate pt demonstrated improved stability w/ RW. Pt transferred back to the room after session where pt was left sitting in upright chair, call bell and tray in reach, spouse present in the room, nurse notified after session. Patient will benefit from skilled intervention to address the above impairments. Patient?s rehabilitation potential is considered to be Good  Factors which may influence rehabilitation potential include:   ? None noted  ? Mental ability/status  ? Medical condition  ? Home/family situation and support systems  ? Safety awareness  ? Pain tolerance/management  ? Other:      PLAN :  Recommendations and Planned Interventions:  ?           Bed Mobility Training             ? Neuromuscular Re-Education  ? Transfer Training                   ? Orthotic/Prosthetic Training  ? Gait Training                          ? Modalities  ? Therapeutic Exercises          ? Edema Management/Control  ? Therapeutic Activities            ? Patient and Family Training/Education  ? Other (comment):    Frequency/Duration: Patient will be followed by physical therapy 1-2 times per day to address goals. Discharge Recommendations: Rehab vs HH pending pt's progress  Further Equipment Recommendations for Discharge: N/A     SUBJECTIVE:   Patient stated ? I use to be able to do a lot more than what I can do now.?    OBJECTIVE DATA SUMMARY:     Past Medical History:   Diagnosis Date    CHF (congestive heart failure) ([facility] [address]     Hypertension      Past Surgical History:   Procedure Laterality Date    HX AORTIC VALVE REPLACEMENT       Barriers to Learning/Limitations: yes;  physical  Compensate with: Verbal Cues and Tactile Cues  Prior Level of Function/Home Situation:   Home Situation  Home Environment: Independent living([facility])  # Steps to Enter: 0  One/Two Story Residence: One story  Living Alone: Yes  Support Systems: Spouse/Significant Other/Partner  Patient Expects to be Discharged to[de-identified] Unknown  Current DME Used/Available at Home: Walker, rolling  Critical Behavior:  Neurologic State: Alert  Orientation Level: Oriented X4  Psychosocial  Family  Behaviors: Calm; Cooperative  Purposeful Interaction: Yes  Pt Identified Daily Priority: Clinical issues (comment)  Caritas Process: Nurture loving kindness;Establish trust;Teaching/learning; Attend basic human needs;Create healing environment  Caring Interventions: Reassure; Therapeutic modalities  Reassure:  Therapeutic listening; Informing; Acceptance;Caring rounds  Therapeutic Modalities: Humor; Intentional therapeutic touch  Skin Condition/Temp: Warm;Dry  Family  Behaviors: Calm; Cooperative  Skin Integumentary  Skin Color: Appropriate for ethnicity  Skin Condition/Temp: Warm;Dry  Strength:    Strength: Generally decreased, functional  Tone & Sensation:   Tone: Normal  Sensation: Intact  Range Of Motion:  AROM: Generally decreased, functional  Functional Mobility:  Bed Mobility:  Supine to Sit: Moderate assistance  Scooting: Minimum assistance  Transfers:  Sit to Stand: Minimum assistance(Bed elevated for assistance.)  Stand to Sit: Minimum assistance  Balance:   Sitting: Intact  Standing: Impaired; With support  Standing - Static: Fair  Standing - Dynamic : Fair  Ambulation/Gait Training:  Distance (ft): 60 Feet (ft)  Assistive Device: Gait belt;Walker, rolling  Ambulation - Level of Assistance: Contact guard assistance;Minimal assistance  Gait Description (WDL): Exceptions to WDL  Gait Abnormalities: Decreased step clearance  Base of Support: Center of gravity altered;Narrowed  Speed/Divya: Slow  Step Length: Left shortened;Right shortened  Swing Pattern: Left asymmetrical;Right asymmetrical    Pain:  Pain Scale 1: Numeric (0 - 10)  Pain Intensity 1: 0  Activity Tolerance:   Fair  Please refer to the flowsheet for vital signs taken during this treatment. After treatment:   ?         Patient left in no apparent distress sitting up in chair  ? Patient left in no apparent distress in bed  ? Call bell left within reach  ? Nursing notified  ? Caregiver present (spouse)  ? Bed alarm activated    COMMUNICATION/EDUCATION:   ?         Fall prevention education was provided and the patient/caregiver indicated understanding. ? Patient/family have participated as able in goal setting and plan of care. ?         Patient/family agree to work toward stated goals and plan of care.   ? Patient understands intent and goals of therapy, but is neutral about his/her participation. ? Patient is unable to participate in goal setting and plan of care.     Thank you for this referral.  Fay Pierre, PT   Time Calculation: 19 mins   Eval Complexity: History: HIGH Complexity :3+ comorbidities / personal factors will impact the outcome/ POC Exam:LOW Complexity : 1-2 Standardized tests and measures addressing body structure, function, activity limitation and / or participation in recreation  Presentation: LOW Complexity : Stable, uncomplicated  Clinical Decision Making:Low Complexity ambulate >30 ft  Overall Complexity:LOW

## 2019-06-02 NOTE — PROGRESS NOTES
Chart reviewed by CM on call. Met with pt and his wife at bedside. Wife states they are from [place] and he will return to [address] [place] [address] for rehab. Reason for Admission:  Dehydration                  RRAT Score:     18             Do you (patient/family) have any concerns for transition/discharge? no              Plan for utilizing home health:  Discharge to SNF    Current Advanced Directive/Advance Care Plan: On file  Likelihood of readmission?   mod            Transition of Care Plan:  SNF    Care Management Interventions  PCP Verified by CM:  Yes  Mode of Transport at Discharge: BLS  Transition of Care Consult (CM Consult): SNF  Partner SNF: No  Reason Why Partner SNF Not Chosen: (Lives at [address] [place] [address])  Current Support Network: Assisted Living  Plan discussed with Pt/Family/Caregiver: Yes  Freedom of Choice Offered: Yes  Discharge Location  Discharge Placement: Skilled nursing facility

## 2019-06-02 NOTE — PROGRESS NOTES
0715:  Assumed care for patient, received bedside report from Frank Keating RN. Patient sitting up in bed quietly with no complaints of pain or discomfort at the time. Whiteboard updated, bed at the lowest position with call bell within reach. 1470: Informed that pateint O2 is 78% on RA, will assess patient. 6280:  Upon assessment patient sitting up in bed eating breakfast with no complaints of shortness of breath, no further symptoms noted. O2 at 98%. Will continue to monitor. Bedside and Verbal shift change report given to Frank Keating RN (oncoming nurse) by Chapis Estes RN   (offgoing nurse). Report included the following information SBAR, Kardex, ED Summary, Procedure Summary, Intake/Output, MAR, Recent Results, Med Rec Status, Cardiac Rhythm Afib BBB and Alarm Parameters .

## 2019-06-02 NOTE — PROGRESS NOTES
1915: Assumed patient care from [address] [facility] [address]. Patient is alert and oriented to person, place, time and situation. Respiratory status is stable on room air. Vital signs are stable. MEWS score is a one. Patient evelin any pain, discomfort, nausea vomiting dizziness or anxiety. White board and fall card is updated. Bed is locked and in lowest position. Call bell, water and personal belongings are within reach. Patient has no questions, comments or concerns after bedside shift report. 0700: Patient had an uneventful shift. Respiratory status, vital signs and MEWS score remained stable. Patient was resting quietly with no signs of distress noted. Bed locked and in lowest position. Call bell water and personal belongings were within reach. Patient had no questions, comments or concerns after bedside shift report.  Bedside report given to Arsh Teixeira R.N.

## 2019-06-03 LAB
ANION GAP SERPL CALC-SCNC: 8 MMOL/L (ref 3–18)
BUN SERPL-MCNC: 30 MG/DL (ref 7–18)
BUN/CREAT SERPL: 26 (ref 12–20)
CALCIUM SERPL-MCNC: 8.4 MG/DL (ref 8.5–10.1)
CHLORIDE SERPL-SCNC: 104 MMOL/L (ref 100–108)
CO2 SERPL-SCNC: 28 MMOL/L (ref 21–32)
CREAT SERPL-MCNC: 1.16 MG/DL (ref 0.6–1.3)
DIGOXIN SERPL-MCNC: 1.4 NG/ML (ref 0.9–2)
ERYTHROCYTE [DISTWIDTH] IN BLOOD BY AUTOMATED COUNT: 14.8 % (ref 11.6–14.5)
GLUCOSE SERPL-MCNC: 87 MG/DL (ref 74–99)
HCT VFR BLD AUTO: 43.1 % (ref 36–48)
HGB BLD-MCNC: 14.6 G/DL (ref 13–16)
MCH RBC QN AUTO: 33.7 PG (ref 24–34)
MCHC RBC AUTO-ENTMCNC: 33.9 G/DL (ref 31–37)
MCV RBC AUTO: 99.5 FL (ref 74–97)
PLATELET # BLD AUTO: 163 K/UL (ref 135–420)
PMV BLD AUTO: 12.5 FL (ref 9.2–11.8)
POTASSIUM SERPL-SCNC: 3.9 MMOL/L (ref 3.5–5.5)
RBC # BLD AUTO: 4.33 M/UL (ref 4.7–5.5)
SODIUM SERPL-SCNC: 140 MMOL/L (ref 136–145)
WBC # BLD AUTO: 5.9 K/UL (ref 4.6–13.2)

## 2019-06-03 PROCEDURE — 97530 THERAPEUTIC ACTIVITIES: CPT

## 2019-06-03 PROCEDURE — 80048 BASIC METABOLIC PNL TOTAL CA: CPT

## 2019-06-03 PROCEDURE — 65660000000 HC RM CCU STEPDOWN

## 2019-06-03 PROCEDURE — 80162 ASSAY OF DIGOXIN TOTAL: CPT

## 2019-06-03 PROCEDURE — 74011250636 HC RX REV CODE- 250/636: Performed by: HOSPITALIST

## 2019-06-03 PROCEDURE — 76450000000

## 2019-06-03 PROCEDURE — 36415 COLL VENOUS BLD VENIPUNCTURE: CPT

## 2019-06-03 PROCEDURE — 97116 GAIT TRAINING THERAPY: CPT

## 2019-06-03 PROCEDURE — 74011000250 HC RX REV CODE- 250: Performed by: HOSPITALIST

## 2019-06-03 PROCEDURE — 85027 COMPLETE CBC AUTOMATED: CPT

## 2019-06-03 PROCEDURE — 74011250637 HC RX REV CODE- 250/637: Performed by: HOSPITALIST

## 2019-06-03 RX ADMIN — CEFTRIAXONE 1 G: 1 INJECTION, POWDER, FOR SOLUTION INTRAMUSCULAR; INTRAVENOUS at 18:10

## 2019-06-03 RX ADMIN — TORSEMIDE 10 MG: 20 TABLET ORAL at 08:40

## 2019-06-03 RX ADMIN — ASPIRIN 81 MG 81 MG: 81 TABLET ORAL at 08:39

## 2019-06-03 RX ADMIN — DIGOXIN 0.12 MG: 125 TABLET ORAL at 08:40

## 2019-06-03 RX ADMIN — SIMVASTATIN 20 MG: 20 TABLET, FILM COATED ORAL at 21:03

## 2019-06-03 RX ADMIN — APIXABAN 2.5 MG: 2.5 TABLET, FILM COATED ORAL at 08:40

## 2019-06-03 RX ADMIN — METOPROLOL SUCCINATE 100 MG: 100 TABLET, EXTENDED RELEASE ORAL at 08:40

## 2019-06-03 RX ADMIN — APIXABAN 2.5 MG: 2.5 TABLET, FILM COATED ORAL at 21:03

## 2019-06-03 NOTE — PROGRESS NOTES
Transition of care TBD  Patient lives with his wife in [address] living at [facility]. Patients family is having a meeting with Julia at [phone] at [facility] about LTC benefits as to the next step. After they meet with [facility] as to plan they will inform cm    1400 met with 2 sons and wife at bedside. They had many question but the plan for them to meet with Catrachita at [facility] about d/c plan they will again let cm know  Cm will continue to follow  1540 met with wife she wants to submit him for rehab.  Cm placed referral as requested

## 2019-06-03 NOTE — PROGRESS NOTES
conducted a Follow up consultation and Spiritual Assessment for Niurka Shin, who is a 80 y.o.,male. Continued the relationship of care and support. Listened empathically. Offered prayer and assurance of continued prayer on patients behalf. Plan:  Chaplains will continue to follow and will provide pastoral care as needed or requested.  recommends bedside caregivers page the  on duty if patient shows signs of acute spiritual or emotional distress. Father ANTONETTE SantiagoDiv.   [address] [facility] - Office

## 2019-06-03 NOTE — ACP (ADVANCE CARE PLANNING)
Advance Directive in EMR. Primary healthcare agent is wife, Gabrielle Ramirez [phone]. Son Dre Dale is named as secondary and son Barney Velasquez is named at next if Zina Barroso not available. No contact information available for sons. Patient is DNR, DDNR signed by wife. Family considering hospice upon return to the [address] [place] [address]. No decision yet.

## 2019-06-03 NOTE — PROGRESS NOTES
Palliative RN Ed Hazel, BEBA Barrett and I met with Mr Frankey Coddington family in the waiting area on third floor. Mrs Roya Mercedes is present with their two sons Darwin Tena and Laurine Ormond who share that Mr Roya Mercedes is asleep at this time. Compassion and listening support was offered as family shared a little about Mr Roya Mercedes. He just celebrated his 89th birthday with a big family party and also their 65th wedding anniversary. Family also shared their understanding of how sick his heart is. Abrahan live at [facility] in independent living. Family shared that they would like for Mr Roya Mercedes to go home while they recognize Ms Roya Mercedes needs more help to care for Mr Roya Mercedes at home. Hospice care was introduced. Family continues to express desire for rehab, to buy time until they can speak with [facility] staff and find out best options for his safe return. Ms Roya Mercedes appears to be very overwhelmed with all the decisions to be made. Sons Mik and Laurine Ormond both live out of town and appear to be very helpful to her. Mr Roya Mercedes has AMD. DDNR signed by Ms Roya Mercedes. When team met with Mr Roya Mercedes, he is clear where he is but not as clear about other details. No spiritual concerns were voiced at this time. Family expressed gratitude for visit and support, information. Will continue to follow as decisions are made.

## 2019-06-03 NOTE — PROGRESS NOTES
0710:  Assumed care for patient, received bedside report from Petey Hernandez RN. Patient quietly lying in bed with no complaints of pain or discomfort at the time. Whiteboard updated, bed at the lowest position with call bell within reach.

## 2019-06-03 NOTE — PROGRESS NOTES
[address] [facility] [phone] (COPE)    Patient Name: Torrey Coppola  YOB: 1929    Reason for Consult: Goals of care  Requesting Provider: Dr. Soco Stout   Primary Care Physician: Bj Patel MD     SUMMARY:   Torrey Coppola is a 80 y.o. with a past history of CHF, HTN, A-fib, valve replacement who was admitted on 6/1/2019 from [place] [address] at the [facility] with a diagnosis of Weakness, Acute on chronic systolic diastolic CHF - last echo with EF of 25-63%, Grade 2 diastolic dysfunction, mod to severe MR, mild to mod Pulmonary HTN. Current medical issues leading to Palliative Medicine involvement include: decline in heart function. Palliative medicine consult noted and appreciated. PM team members Vilma Franklin (), Zully SOLANO and this writer went to see patient. Patient's wife and two sons requested to meet with PM team privately before meeting with patient. We introduced palliative medicine and family expressed appreciation and stated they had been waiting to speak with us. They did have a good understanding of Mr. Frankey Coddington condition. They shared they had been kept well informed by Dr. Soco Stout. They had questions regarding hospice vs rehab vs long term care. We answered all of their questions to their satisfaction. Wife admits that she is unable to provide the needed care for her  as she is elderly herself. Sons live [place] of [place] so would not be able to assist. We discussed the need for 24 hour care either within their AL apartment or in long term care facility. We explained the option of hospice as Mr. Frankey Coddington heart is very weak. Sons seemed very interested in hospice but were also hopeful that rehab may improve their fathers strength and allow him to be more active. Mrs. Roya Mercedes seemed very anxious and was not able to make any decisions. She stated \"This is all so new, he was alright a week ago\".  She did admit she needed more help if Mr. Kathy Baker returned to their apartment. She was undecided about rehab having any potential benefits \"but I can't take care of him by myself any more\". She seemed very interested in getting extra help come into their apartment. Family shared that Mr. Kathy Baker has a long term insurance plan but do not know what benefits he has. We encouraged them to look at the insurance policy and talk to social worked from [facility] to learn more about their options. We went to see Mr. Kathy Baker. He was pleasant though confused. He was able to state where he was but unable to state why he was here, the date or the president. He did acknowledge that he had just turned 80 and had been  65 years. He stated his sons \"would figure everything out\" regarding plans for when he left the hospital. Son, Luisa Garcia, made an appointment at 2:30 today to speak with Nury Angeles and Albert morris at the [facility]. They want to talk about all options including hospice and rehab. No decisions at this time. We discussed CPR including attempts with chest compressions, potential cardioversion\" shocks\" as well as intubation. We also explained Do not resuscitate which would mean we allow a natural death without aggressive interventions. Mr. Kathy Baker was not able to grasp all of discussion but did state that he did not want resuscitation. Mrs. Kathy Baker stated that was consistent with his living will and previous conversations. DDNR signed by his wife and placed in chart for Dr. Domi Edmondson to verify and sign. PM team will follow up after family has more information. RECOMMENDATIONS:   1. Continue current care  2. DNR  3. Continue discussions with family about care after hospitalization. Recommend hospice, family is considering but has not made any decisions yet     GOALS OF CARE / TREATMENT PREFERENCES:     GOALS OF CARE:  Patient/Health Care Proxy Stated Goals:  Other (comment)(Wants to return to his home but has not made decision about  rehab vs comfort care)    TREATMENT PREFERENCES:   Code Status: DNR    Advance Care Planning:    Advance Care Planning 6/3/2019   Patient's Healthcare Decision Maker is: Named in scanned ACP document   Confirm Advance Directive Yes, on file   Does the patient have other document types Do Not Resuscitate     Advance Directive in EMR.     Primary healthcare agent is wife, Mitzie Dance [phone].     Son Brian Zelaya is named as secondary and son Natalia Kearney is named at next if Mik not available.      No contact information available for sons.     Patient is DNR, DDNR signed by wife. Artificially Administered Nutrition: No feeding tube     CLINICAL ASSESSMENT:   Palliative Performance Scale (PPS):  PPS: 30    Modified ESAS Completed by: provider     Drowsiness: 0   Depression: 0 Pain: 0   Anxiety: 0 Nausea: 0     Dyspnea: 0           Stool Occurrence(s): 0     Clinical Pain Assessment (nonverbal scale for severity on nonverbal patients):   Clinical Pain Assessment  Severity: 0     Activity (Movement): Lying quietly, normal position     PSYCHOSOCIAL/SPIRITUAL ASSESSMENT:   Palliative IDT has assessed this patient for cultural preferences / practices and a referral made as appropriate to needs (Cultural Services, Patient Advocacy, Ethics, etc.)    Ret[employer].  65 years.   Two sons, neither is local.   Four grandchildren    Lives at [place] [address] Any spiritual / Restorationism concerns:      [] Yes /  [] No (Not addressed this visit.)    Caregiver Burnout:  [x] Yes /  [] No /  [] No Caregiver Present      Anticipatory grief assessment:   [x] Normal  / [] Maladaptive

## 2019-06-03 NOTE — PROGRESS NOTES
Hospitalist Progress Note    Patient: Joseph Weinstein MRN: [id number]  CSN: [id number]    YOB: 1929  Age: 80 y.o. Sex: male    DOA: 6/1/2019 LOS:  LOS: 2 days          Chief Complaint:    CHF      Assessment/Plan     81 yo with CHF and chronic persistent Afib came with progressive decline, weakness, not eating for a week    Had delerium this am, and apparently was up all night per nursing  Wife and son at bedside this am  He is in NAD    81 yo male admitted for weakness, acute on chronic CHF-received judicious fluid and felt better yesterday    Metabolic encephalopathy this am/delerium      Acute on chronic systolic diastolic CHF - last echo with EF of 85-64%, grade 2 diastolic dysfunction, mod to severe MR, mild to mod pulm HTN. demadex PO    Possible UTI-abnl UA-start rocephin, send culture      Very poor prognosis given very low EF. he is DNR, Christiane Fear will be signed prior to d/c-palliative team saw family this am, and they are deciding on possible conversion to hospice care     A-fib - continue digoxin, anticoagulation with eliquis.  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 Last three shifts:  06/01 1901 - 06/03 0700  In: 5 [P.O.:720]  Out: 450 [Urine:450]    Exam:    General: elderly thin frail, cachectic WM, NAD  Head/Neck: NCAT, supple, No masses, No lymphadenopathy  CVS:irreg rhythm , reg rate  Lungs:Clear to auscultation bilaterally, no wheezes, rhonchi, or rales  Abdomen: Soft, Nontender, No distention, Normal Bowel sounds, No hepatomegaly  Extremities: No C/C/E, pulses palpable 2+  Neuro:grossly normal , follows commands  Psych:pleasantly confused, cooperative                Labs: Results:       Chemistry Recent Labs     06/03/19  0330 06/02/19 0330 06/01/19  1308   GLU 87 83 102*    141 140   K 3.9 3.7 4.1    104 102   CO2 28 30 31   BUN 30* 33* 34*   CREA 1.16 1.18 1.36*   CA 8.4* 8.6 8.4*   AGAP 8 7 7   BUCR 26* 28* 25*   AP  --   --  294*   TP  --   --  6.6   ALB  --   --  2.6*   GLOB  --   --  4.0   AGRAT  --   --  0.7*      CBC w/Diff Recent Labs     06/03/19  0330 06/02/19  0330 06/01/19  1308   WBC 5.9 4.9 5.2   RBC 4.33* 4.24* 3.98*   HGB 14.6 14.2 13.3   HCT 43.1 42.2 39.7    163 148   GRANS  --   --  74*   LYMPH  --   --  12*   EOS  --   --  0      Cardiac Enzymes Recent Labs     06/01/19  1308      CKND1 2.1      Coagulation No results for input(s): PTP, INR, APTT in the last 72 hours. No lab exists for component: INREXT    Lipid Panel No results found for: CHOL, CHOLPOCT, CHOLX, CHLST, CHOLV, 549472, HDL, LDL, LDLC, DLDLP, 750163, VLDLC, VLDL, TGLX, TRIGL, TRIGP, TGLPOCT, CHHD, CHHDX   BNP No results for input(s): BNPP in the last 72 hours.    Liver Enzymes Recent Labs     06/01/19  1308   TP 6.6   ALB 2.6*   *   SGOT 41*      Thyroid Studies No results found for: T4, T3U, TSH, TSHEXT     Procedures/imaging: see electronic medical records for all procedures/Xrays and details which were not copied into this note but were reviewed prior to creation of Sabino Crane MD

## 2019-06-03 NOTE — PROGRESS NOTES
Problem: Mobility Impaired (Adult and Pediatric)  Goal: *Acute Goals and Plan of Care (Insert Text)  Description  Physical Therapy Goals   Initiated 6/2/2019 and to be accomplished within 5-7 day(s)  1. Patient will move from supine <> sit with S in prep for out of bed activity and change of position. 2.  Patient will perform sit<> stand with S with LRAD in prep for transfers/ambulation. 3.  Patient will transfer from bed <> chair with S with LRAD for time up in chair for completion of ADL activity. 4.  Patient will ambulate 150 feet with LRAD/S for improved functional mobility/safe discharge. Outcome: Progressing Towards Goal   PHYSICAL THERAPY TREATMENT    Patient: Elaine Baxter (41 y.o. male)  Date: 6/3/2019  Diagnosis: Dehydration [E86.0] <principal problem not specified>    Precautions: Fall   Chart, physical therapy assessment, plan of care and goals were reviewed. ASSESSMENT:  Pt showing improved mobility and increasing ambulation distance and requiring less assistance with bed mobility and transfers. Some VCs for correct RW management and safety. Pt left sitting in chair post tx with family present. Cont POC. Progression toward goals:  ?      Improving appropriately and progressing toward goals  ? Improving slowly and progressing toward goals  ? Not making progress toward goals and plan of care will be adjusted     PLAN:  Patient continues to benefit from skilled intervention to address the above impairments. Continue treatment per established plan of care. Discharge Recommendations:  Rehab  Further Equipment Recommendations for Discharge:  rolling walker     SUBJECTIVE:   Patient stated ? I'm fine, lets keep going  ?     OBJECTIVE DATA SUMMARY:   Critical Behavior:  Neurologic State: Alert, Confused  Orientation Level: Disoriented to time    Functional Mobility Training:  Bed Mobility:  Supine to Sit: Contact guard assistance  Scooting: Stand-by assistance    Transfers:  Sit to Stand: Contact guard assistance  Stand to Sit: Contact guard assistance    Balance:  Sitting: Intact  Standing: Impaired; With support  Standing - Static: Fair  Standing - Dynamic : Fair  Ambulation/Gait Training:  Distance (ft): 100 Feet (ft)  Assistive Device: Walker, rolling;Gait belt  Ambulation - Level of Assistance: Contact guard assistance  Gait Abnormalities: Decreased step clearance    Step Length: Right shortened;Left shortened  Swing Pattern: Left asymmetrical;Right asymmetrical      Pain:  Pain Scale 1: Numeric (0 - 10)  Pain Intensity 1: 0    Activity Tolerance:   Fair     After treatment:   ? Patient left in no apparent distress sitting up in chair  ? Patient left in no apparent distress in bed  ? Call bell left within reach  ? Nursing notified  ? Caregiver present  ?  Bed alarm activated      Jyothi Gleason PTA   Time Calculation: 24 mins

## 2019-06-03 NOTE — PROGRESS NOTES
1915: Assumed patient care from Jewels Ocampo RN. Patient is alert and oriented to person, place, time and situation. Respiratory status is stable on room air. Vital signs are stable. MEWS score is a one. Patient denies any pain, discomfort, nausea vomiting dizziness or anxiety. White board and fall card is updated. Bed is locked and in lowest position. Call bell, water and personal belongings are within reach. Patient has no questions, comments or concerns after bedside shift report. 0700: Patient had an uneventful shift. Respiratory status, vital signs and MEWS score remained stable. Patient was resting quietly with no signs of distress noted. Bed locked and in lowest position. Call bell water and personal belongings were within reach. Patient had no questions, comments or concerns after bedside shift report.  Bedside report given to [facility] GUILLE

## 2019-06-03 NOTE — CDMP QUERY
Patient admitted with CHF, noted to have secondary diagnosis of A-fib. If possible, please document in progress notes and d/c summary if you are evaluating and/or treating any of the following:    => Paroxysmal Atrial Fibrillation  => Persistent Atrial Fibrillation  => Permanent Atrial Fibrillation   => Other Explanation of clinical findings  => Clinically Undetermined (no explanation for clinical findings)    The medical record reflects the following:     Risk Factors: elderly patient with Hx of A-fib     Clinical Indicators: EKG shows A-fib with heart rate of 67     Treatment: Digoxin, eliquis    Please clarify and document your clinical opinion in the progress notes and discharge summary including the definitive and/or presumptive diagnosis, (suspected or probable), related to the above clinical findings. Please include clinical findings supporting your diagnosis.    -------------------------------------------------------------------------------------------------  Paroxysmal:  begins suddenly and stops on its own. Symptoms can be mild or severe. They stop within about a week, but usually in less than 24 hours. Persistent: continues for more than a week. It may stop on its own, or it can be stopped with treatment. Permanent: cannot be restored with treatment    Thank you.   Benny Barker RN [facility]

## 2019-06-03 NOTE — PROGRESS NOTES
Occupational Therapy Evaluation/Treatment Attempt    Chart reviewed. Attempted Occupational Therapy Evaluation/Treatment, however, patient unable to be seen due to:  []  Nausea/vomiting  []  Eating  []  Pain  []  Patient too lethargic  []  Off Unit for testing/procedure  []  Dialysis treatment in progress   []  Telemetry Results  [x]  Other:  Pt pleasantly declined w/ difficulty maintaining eyes open during conversation. Pt states he will be happy to attempt on next date & jokes \"hopefully I'm here\". (Spouse had left room upon entry and notified this writer that pt likely too fatigued, but ok to ask pt). Will f/u later as patient's schedule allows.    Thank you for this referral.  Nahomi Lawrence, OTR/L

## 2019-06-04 VITALS
RESPIRATION RATE: 18 BRPM | WEIGHT: 142 LBS | HEART RATE: 67 BPM | TEMPERATURE: 97.3 F | OXYGEN SATURATION: 96 % | DIASTOLIC BLOOD PRESSURE: 71 MMHG | HEIGHT: 67 IN | SYSTOLIC BLOOD PRESSURE: 112 MMHG | BODY MASS INDEX: 22.29 KG/M2

## 2019-06-04 LAB
ANION GAP SERPL CALC-SCNC: 11 MMOL/L (ref 3–18)
BUN SERPL-MCNC: 27 MG/DL (ref 7–18)
BUN/CREAT SERPL: 27 (ref 12–20)
CALCIUM SERPL-MCNC: 8.1 MG/DL (ref 8.5–10.1)
CHLORIDE SERPL-SCNC: 101 MMOL/L (ref 100–108)
CO2 SERPL-SCNC: 27 MMOL/L (ref 21–32)
CREAT SERPL-MCNC: 1.01 MG/DL (ref 0.6–1.3)
ERYTHROCYTE [DISTWIDTH] IN BLOOD BY AUTOMATED COUNT: 14.8 % (ref 11.6–14.5)
GLUCOSE SERPL-MCNC: 74 MG/DL (ref 74–99)
HCT VFR BLD AUTO: 43.6 % (ref 36–48)
HGB BLD-MCNC: 14.5 G/DL (ref 13–16)
MCH RBC QN AUTO: 33.4 PG (ref 24–34)
MCHC RBC AUTO-ENTMCNC: 33.3 G/DL (ref 31–37)
MCV RBC AUTO: 100.5 FL (ref 74–97)
PLATELET # BLD AUTO: 176 K/UL (ref 135–420)
PMV BLD AUTO: 12.7 FL (ref 9.2–11.8)
POTASSIUM SERPL-SCNC: 4.4 MMOL/L (ref 3.5–5.5)
RBC # BLD AUTO: 4.34 M/UL (ref 4.7–5.5)
SODIUM SERPL-SCNC: 139 MMOL/L (ref 136–145)
WBC # BLD AUTO: 5.3 K/UL (ref 4.6–13.2)

## 2019-06-04 PROCEDURE — 74011250637 HC RX REV CODE- 250/637: Performed by: HOSPITALIST

## 2019-06-04 PROCEDURE — 36415 COLL VENOUS BLD VENIPUNCTURE: CPT

## 2019-06-04 PROCEDURE — 85027 COMPLETE CBC AUTOMATED: CPT

## 2019-06-04 PROCEDURE — 80048 BASIC METABOLIC PNL TOTAL CA: CPT

## 2019-06-04 RX ORDER — NITROFURANTOIN 25; 75 MG/1; MG/1
100 CAPSULE ORAL 2 TIMES DAILY
Qty: 6 CAP | Refills: 0 | Status: SHIPPED
Start: 2019-06-04 | End: 2019-06-07

## 2019-06-04 RX ADMIN — DIGOXIN 0.12 MG: 125 TABLET ORAL at 10:11

## 2019-06-04 RX ADMIN — APIXABAN 2.5 MG: 2.5 TABLET, FILM COATED ORAL at 10:13

## 2019-06-04 RX ADMIN — METOPROLOL SUCCINATE 100 MG: 100 TABLET, EXTENDED RELEASE ORAL at 10:11

## 2019-06-04 RX ADMIN — ASPIRIN 81 MG 81 MG: 81 TABLET ORAL at 10:11

## 2019-06-04 RX ADMIN — TORSEMIDE 10 MG: 20 TABLET ORAL at 10:12

## 2019-06-04 NOTE — PROGRESS NOTES
Occupational Therapy Evaluation/Treatment Attempt    Chart reviewed. Attempted Occupational Therapy Evaluation/Treatment, however, patient unable to be seen due to:  []  Nausea/vomiting  []  Eating  []  Pain  []  Patient too lethargic  []  Off Unit for testing/procedure  []  Dialysis treatment in progress   []  Telemetry Results  [x]  Other:  Patient discharged to Rehab prior to attempt. Will f/u later as patient's schedule allows.    Thank you for this referral.  Nahomi Lawrence, OTR/L

## 2019-06-04 NOTE — PROGRESS NOTES
conducted a Follow up consultation and Spiritual Assessment for Adam Hebert, who is a 80 y.o.,male. Continued the relationship of care and support. Listened empathically. Offered prayer and assurance of continued prayer on patients behalf. Plan:  Chaplains will continue to follow and will provide pastoral care as needed or requested.  recommends bedside caregivers page the  on duty if patient shows signs of acute spiritual or emotional distress. Father ANTONETTE GurrolaDiv.   [facility] - Office

## 2019-06-04 NOTE — DISCHARGE INSTRUCTIONS
Patient Education        ACE Inhibitors: Care Instructions  Your Care Instructions    ACE (angiotensin-converting enzyme) inhibitors lower blood pressure. They also treat heart failure and prevent heart attacks and strokes. They block an enzyme that makes blood vessels narrow. As a result, the blood vessels relax and widen. This lowers blood pressure. These medicines also put more water and salt into the urine. This lowers blood pressure too. These medicines are a good choice for people with diabetes. They don't affect blood sugar levels, and they may protect the kidneys. Examples include:  · Benazepril (Lotensin). · Lisinopril (Prinivil, Zestril). · Ramipril (Altace). Before you start taking this medicine, make sure your doctor knows if you take other medicines, especially water pills (diuretics) or potassium tablets. And tell your doctor if you use a salt substitute. You should not take an ACE inhibitor if you are pregnant or planning to become pregnant. You may need regular blood tests. Follow-up care is a key part of your treatment and safety. Be sure to make and go to all appointments, and call your doctor if you are having problems. It's also a good idea to know your test results and keep a list of the medicines you take. How can you care for yourself at home? · Take your medicines exactly as prescribed. Be sure to take high blood pressure medicine every day. Since high blood pressure often has no symptoms, it is easy to forget to take the pills. Call your doctor if you think you are having a problem with your medicine. · ACE inhibitors can cause a dry cough. If the cough is bad, talk to your doctor. You may need to try a different medicine. · ACE inhibitors can cause an allergic reaction of the skin. Symptoms may range from mild swelling to painful welts. Severe swelling can make it hard to breathe, but this is very rare. · Check with your doctor or pharmacist before you use any other medicines. This includes over-the-counter medicines. Make sure your doctor knows all of the medicines, vitamins, herbal products, and supplements you take. Taking some medicines together can cause problems. When should you call for help? Call your doctor now or seek immediate medical care if:    · You develop a new cough.     · You think you are having problems with your medicine.    Watch closely for changes in your health, and be sure to contact your doctor if you have any problems. Where can you learn more? Go to http://maik-arian.info/. Enter J039 in the search box to learn more about \"ACE Inhibitors: Care Instructions. \"  Current as of: July 22, 2018  Content Version: 11.9  © 0768-4017 [employer]. Care instructions adapted under license by [employer] (which disclaims liability or warranty for this information). If you have questions about a medical condition or this instruction, always ask your healthcare professional. [place] [address] any warranty or liability for your use of this information. Patient Education        Learning About ACE Inhibitors for Heart Failure  Introduction    ACE (angiotensin-converting enzyme) inhibitors block an enzyme that makes blood vessels narrow. As a result, the blood vessels relax and widen. This lowers blood pressure. These medicines also put more water and salt into the urine. This also lowers blood pressure. In heart failure, your heart does not pump as much blood as your body needs. These medicines:  · Make it easier for your heart to pump. · Help reduce symptoms. · Make a heart attack or stroke less likely. Examples  These medicines include:  · Benazepril (Lotensin). · Lisinopril (Prinivil, Zestril). · Ramipril (Altace). This is not a complete list.  Possible side effects  Side effects may include:  · Cough. · Low blood pressure. You may feel dizzy and weak. · High potassium levels.   · An allergic reaction. You may have other side effects or reactions not listed here. Check the information that comes with your medicine. What to know about taking this medicine  · ACE inhibitors:  ? Are often used to treat heart failure. They may be the only medicine used if your only symptoms are feeling tired and mildly short of breath with no fluid buildup (edema). But in most other cases, they are used with other medicines. ? Can cause a dry cough. If the cough is bad, talk to your doctor. You may need to try a different medicine. ? Can relieve symptoms, improve how you feel, and make it less likely you will need to stay in a hospital. And they can reduce the risk of early death. ? Can cause an allergic reaction of the skin. Symptoms may range from mild swelling to painful welts. It is rare to have severe swelling that makes it hard to breathe. · Take your medicines exactly as prescribed. Call your doctor if you think you are having a problem with your medicine. · Check with your doctor or pharmacist before you use any other medicines. This includes over-the-counter medicines. Make sure your doctor knows all of the medicines, vitamins, herbal products, and supplements you take. Taking some medicines together can cause problems. · You should not take an ACE inhibitor if you are pregnant or planning to become pregnant. · You may need regular blood tests. Where can you learn more? Go to http://maik-arian.info/. Enter I043 in the search box to learn more about \"Learning About ACE Inhibitors for Heart Failure. \"  Current as of: July 22, 2018  Content Version: 11.9  © 0219-2842 [employer]. Care instructions adapted under license by [employer] (which disclaims liability or warranty for this information).  If you have questions about a medical condition or this instruction, always ask your healthcare professional. Geoff Weaver disclaims any warranty or liability for your use of this information. DISCHARGE SUMMARY from Nurse    PATIENT INSTRUCTIONS:    After general anesthesia or intravenous sedation, for 24 hours or while taking prescription Narcotics:  · Limit your activities  · Do not drive and operate hazardous machinery  · Do not make important personal or business decisions  · Do  not drink alcoholic beverages  · If you have not urinated within 8 hours after discharge, please contact your surgeon on call. Report the following to your surgeon:  · Excessive pain, swelling, redness or odor of or around the surgical area  · Temperature over 100.5  · Nausea and vomiting lasting longer than 4 hours or if unable to take medications  · Any signs of decreased circulation or nerve impairment to extremity: change in color, persistent  numbness, tingling, coldness or increase pain  · Any questions    What to do at Home:  Recommended activity: Activity as tolerated,     *  Please give a list of your current medications to your Primary Care Provider. *  Please update this list whenever your medications are discontinued, doses are      changed, or new medications (including over-the-counter products) are added. *  Please carry medication information at all times in case of emergency situations. These are general instructions for a healthy lifestyle:    No smoking/ No tobacco products/ Avoid exposure to second hand smoke  Surgeon General's Warning:  Quitting smoking now greatly reduces serious risk to your health.     Obesity, smoking, and sedentary lifestyle greatly increases your risk for illness    A healthy diet, regular physical exercise & weight monitoring are important for maintaining a healthy lifestyle    You may be retaining fluid if you have a history of heart failure or if you experience any of the following symptoms:  Weight gain of 3 pounds or more overnight or 5 pounds in a week, increased swelling in our hands or feet or shortness of breath while lying flat in bed. Please call your doctor as soon as you notice any of these symptoms; do not wait until your next office visit. Recognize signs and symptoms of STROKE:    F-face looks uneven    A-arms unable to move or move unevenly    S-speech slurred or non-existent    T-time-call 911 as soon as signs and symptoms begin-DO NOT go       Back to bed or wait to see if you get better-TIME IS BRAIN. Warning Signs of HEART ATTACK     Call 911 if you have these symptoms:   Chest discomfort. Most heart attacks involve discomfort in the center of the chest that lasts more than a few minutes, or that goes away and comes back. It can feel like uncomfortable pressure, squeezing, fullness, or pain.  Discomfort in other areas of the upper body. Symptoms can include pain or discomfort in one or both arms, the back, neck, jaw, or stomach.  Shortness of breath with or without chest discomfort.  Other signs may include breaking out in a cold sweat, nausea, or lightheadedness. Don't wait more than five minutes to call 911 - MINUTES MATTER! Fast action can save your life. Calling 911 is almost always the fastest way to get lifesaving treatment. Emergency Medical Services staff can begin treatment when they arrive -- up to an hour sooner than if someone gets to the hospital by car. The discharge information has been reviewed with the patient. The patient verbalized understanding. Discharge medications reviewed with the patient and appropriate educational materials and side effects teaching were provided.   ___________________________________________________________________________________________________________________________________

## 2019-06-04 NOTE — DISCHARGE SUMMARY
[address]    Name:  Pradeep Delcid  MR#:   [id number]  :  [id number]  ACCOUNT #:  [de-identified]  ADMIT DATE:  2019  DISCHARGE DATE:  2019    DISCHARGE DIAGNOSES:  1.  Dehydration. 2.  Metabolic encephalopathy. 3.  Chronic congestive heart failure. 4.  Chronic persistent atrial fibrillation. HOSPITAL SUMMARY:  The patient is 80years old. He lives with his wife, had been here recently in the hospital for a stay related to his congestive heart failure. He has chronic persistent AFib, hypertension. During that hospitalization, he was treated for exacerbation of his congestive heart failure. He has an EF of 16% to 20%. He usually sees Dr. Margaret Morgan as his cardiologist.  He has been on digoxin and metoprolol. He is on Eliquis for the AFib. He came back to the hospital with weakness, leg weakness, generalized debility, poor appetite and mild dehydration. He was admitted back to the hospital, had some judicious fluid boluses due to his history of CHF and he improved. He had some intermittent metabolic encephalopathy and sleep deprivation here, but today he is doing better. He did a little better yesterday progressing with physical therapy. His wife is hoping for him to get in to acute rehab to see how he does. Of course, his prognosis in general at 80years old with significant systolic congestive heart failure and progressive decline in his health over the last few months, is overall very poor. Today, he is awake and alert. He had a hallucination earlier where he thought someone was in the room, but he reorients pretty easily now and he is looking brighter than he did yesterday. His blood pressure is 116/88, pulse 77, temperature 97.2, respiratory rate 18, SaO2 is 95% on room air. Very cachectic, thin, elderly appearing white male, in no acute distress. Denies chest pain, shortness of breath or abdominal pain.   He has had a good breakfast this morning. He is conversing with his wife. Urinalysis showed trace leukocyte esterase and 2 to 5 wbc's, so we started empiric Rocephin yesterday. We are going to give him Macrobid for the next few days. He had renal insufficiency when he came in, which has improved; BUN is 27, creatinine 1.01. His electrolytes are normal.  White count also within normal range and he had a stool culture on 06/01 that showed no evidence for bacteria and a C. diff test also that was negative. He is stable for discharge from the hospital today. He is at his baseline. Acute rehab is appropriate if he will progress there. Also we have had discussions about transition into hospice which I would have a low threshold for at this point considering his end-stage congestive heart failure and his wife is in agreement with that. On examination, his vital signs are as noted above. His lungs are clear bilaterally. Cardiac exam, irregular rhythm, regular rate. Abdomen soft, nontender. Lower extremities, trace ankle edema. DISCHARGE MEDICATIONS:  1. Aspirin 81 mg daily. 2.  Multivitamin once a day. 3.  Digoxin 0.125 mg daily. 4.  Eliquis 2.5 mg twice daily. 5.  Lisinopril 2.5 mg daily. 6.  Metoprolol 100 mg daily. 7.  Nitrofurantoin 100 mg twice daily for 3 days. 8.  Zocor 20 mg at night. 9.  Demadex 5 mg every other day. 10.  Vitamin D 2000 units daily. Of note, we did do a digoxin level as well and that was in the therapeutic range. His glucose is normal also. DISPOSITION:  [facility]. He has a do not resuscitate and durable DNR has been signed also. DISCHARGE DIET:  He is on a cardiac regular diet. DISCHARGE INSTRUCTIONS:  Continue PT and OT as tolerated with transition into hospice care if he fails to progress at the nursing facility rehab.    40 minutes on discharge.       Roxanne Stroud MD      [id number]  D:  06/04/2019 9:56  T:  06/04/2019 10:02  JOB #:  [id number]    CC:   Hortencia Dancer.  Haleigh Guerrero MD

## 2019-06-04 NOTE — PROGRESS NOTES
Patient in bed resting. No distress noted. Patient discharged by discharge nurse.     [id number]- gave report to Nura Augustin RN at the [facility]

## 2019-06-04 NOTE — PROGRESS NOTES
Discharge instructions reviewed with the patient. Patient verbalized understanding and verified by teach back. All questions answered. IV discontinued, no redness, swelling or pain noted. Patients family in room  for transportation to [facility]. Patient discharged off the unit via wheelchair.  Patient armband removed and shredded

## 2019-06-04 NOTE — PROGRESS NOTES
Transition of care: anticipate d/c to snf today    Telephone call with Fidencio Mcmullen she can accommodate patient today at [facility]  Met with patient and his wife at bedside. He does want to go to [facility] for rehab. Cm has asked wife if her sons will transport him or if he would need transportation. She will let cm know. RN please call report to [facility] [phone] for report. Prior to patient leaving [facility]  Please include all hard scripts for controlled substances, med rec and dc summary in packet. Please medicate for pain prior to dc if possible and needed to help offset delay when patient first arrives to facility. 1 met with son and he informed cm he will drive him over there  Care Management Interventions  PCP Verified by CM:  Yes  Mode of Transport at Discharge: BLS  Transition of Care Consult (CM Consult): SNF  Partner SNF: No  Reason Why Partner SNF Not Chosen: (Lives at [facility])  Current Support Network: Assisted Living  Plan discussed with Pt/Family/Caregiver: Yes  Freedom of Choice Offered: Yes  Discharge Location  Discharge Placement: Skilled nursing facility

## 2019-06-05 LAB
BACTERIA SPEC CULT: NORMAL
SERVICE CMNT-IMP: NORMAL

## 2019-06-12 ENCOUNTER — PATIENT OUTREACH (OUTPATIENT)
Dept: CASE MANAGEMENT | Age: 84
End: 2019-06-12

## 2019-06-12 NOTE — PROGRESS NOTES
Community Care Team Documentation for Patient in [facility]     Patient discharged from  [facility] to [address] [facility], on 6/4/19. Hospital Discharge diagnosis:      1. Dehydration. 2.  Metabolic encephalopathy. 3.  Chronic congestive heart failure. 4.  Chronic persistent atrial fibrillation      SNF Attending Provider:      Anticipated discharge date from SNF: Patient has decided to stay LTC along with hospice    PCP : Claudette Ibarra MD    Nurse Navigator: [facility] Team rounds completed, updates provided by facility. Brief Summary of Care:    Patient will be staying LTC under Hospice . Will follow for 30 days ARLYN. RRAT:  Medium Risk            15       Total Score        3 Has Seen PCP in Last 6 Months (Yes=3, No=0)    2 . Living with Significant Other. Assisted Living. LTAC. SNF. or   Rehab    4 IP Visits Last 12 Months (1-3=4, 4=9, >4=11)    6 Charlson Comorbidity Score (Age + Comorbid Conditions)        Criteria that do not apply:    Patient Length of Stay (>5 days = 3)    Pt.  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